# Patient Record
Sex: MALE | Race: BLACK OR AFRICAN AMERICAN | NOT HISPANIC OR LATINO | Employment: FULL TIME | URBAN - METROPOLITAN AREA
[De-identification: names, ages, dates, MRNs, and addresses within clinical notes are randomized per-mention and may not be internally consistent; named-entity substitution may affect disease eponyms.]

---

## 2021-09-27 ENCOUNTER — IMMUNIZATION (OUTPATIENT)
Dept: URGENT CARE | Facility: CLINIC | Age: 61
End: 2021-09-27
Payer: OTHER GOVERNMENT

## 2021-09-27 DIAGNOSIS — Z23 NEED FOR VACCINATION: Primary | ICD-10-CM

## 2021-09-27 PROCEDURE — 91301 COVID-19, MRNA, LNP-S, PF, 100 MCG/0.5 ML DOSE VACCINE: ICD-10-PCS | Mod: S$GLB,,, | Performed by: EMERGENCY MEDICINE

## 2021-09-27 PROCEDURE — 0012A COVID-19, MRNA, LNP-S, PF, 100 MCG/0.5 ML DOSE VACCINE: CPT | Mod: S$GLB,,, | Performed by: EMERGENCY MEDICINE

## 2021-09-27 PROCEDURE — 0012A COVID-19, MRNA, LNP-S, PF, 100 MCG/0.5 ML DOSE VACCINE: ICD-10-PCS | Mod: S$GLB,,, | Performed by: EMERGENCY MEDICINE

## 2021-09-27 PROCEDURE — 91301 COVID-19, MRNA, LNP-S, PF, 100 MCG/0.5 ML DOSE VACCINE: CPT | Mod: S$GLB,,, | Performed by: EMERGENCY MEDICINE

## 2022-11-04 ENCOUNTER — HOSPITAL ENCOUNTER (INPATIENT)
Facility: HOSPITAL | Age: 62
LOS: 3 days | Discharge: HOME | DRG: 373 | End: 2022-11-07
Attending: EMERGENCY MEDICINE | Admitting: STUDENT IN AN ORGANIZED HEALTH CARE EDUCATION/TRAINING PROGRAM
Payer: COMMERCIAL

## 2022-11-04 DIAGNOSIS — K37 APPENDICITIS, UNSPECIFIED APPENDICITIS TYPE: Primary | ICD-10-CM

## 2022-11-04 DIAGNOSIS — R10.9 ABDOMINAL PAIN: ICD-10-CM

## 2022-11-04 PROBLEM — K35.32 ACUTE APPENDICITIS WITH PERFORATION AND LOCALIZED PERITONITIS, WITHOUT ABSCESS OR GANGRENE: Status: ACTIVE | Noted: 2022-11-04

## 2022-11-04 PROBLEM — K35.30 ACUTE APPENDICITIS WITH LOCALIZED PERITONITIS, WITHOUT PERFORATION, ABSCESS, OR GANGRENE: Status: ACTIVE | Noted: 2022-11-04

## 2022-11-04 LAB
ALBUMIN SERPL BCP-MCNC: 3.3 G/DL (ref 3.5–5.2)
ALP SERPL-CCNC: 66 U/L (ref 55–135)
ALT SERPL W/O P-5'-P-CCNC: 19 U/L (ref 10–44)
ANION GAP SERPL CALC-SCNC: 11 MMOL/L (ref 8–16)
AST SERPL-CCNC: 27 U/L (ref 10–40)
BACTERIA #/AREA URNS AUTO: NORMAL /HPF
BASOPHILS # BLD AUTO: 0.05 K/UL (ref 0–0.2)
BASOPHILS NFR BLD: 0.3 % (ref 0–1.9)
BILIRUB SERPL-MCNC: 1.1 MG/DL (ref 0.1–1)
BILIRUB UR QL STRIP: NEGATIVE
BUN SERPL-MCNC: 16 MG/DL (ref 8–23)
BUN SERPL-MCNC: 18 MG/DL (ref 6–30)
CALCIUM SERPL-MCNC: 8.8 MG/DL (ref 8.7–10.5)
CHLORIDE SERPL-SCNC: 100 MMOL/L (ref 95–110)
CHLORIDE SERPL-SCNC: 101 MMOL/L (ref 95–110)
CLARITY UR REFRACT.AUTO: CLEAR
CO2 SERPL-SCNC: 22 MMOL/L (ref 23–29)
COLOR UR AUTO: YELLOW
CREAT SERPL-MCNC: 1.1 MG/DL (ref 0.5–1.4)
CREAT SERPL-MCNC: 1.1 MG/DL (ref 0.5–1.4)
DIFFERENTIAL METHOD: ABNORMAL
EOSINOPHIL # BLD AUTO: 0 K/UL (ref 0–0.5)
EOSINOPHIL NFR BLD: 0.1 % (ref 0–8)
ERYTHROCYTE [DISTWIDTH] IN BLOOD BY AUTOMATED COUNT: 13.5 % (ref 11.5–14.5)
EST. GFR  (NO RACE VARIABLE): >60 ML/MIN/1.73 M^2
GLUCOSE SERPL-MCNC: 114 MG/DL (ref 70–110)
GLUCOSE SERPL-MCNC: 125 MG/DL (ref 70–110)
GLUCOSE UR QL STRIP: NEGATIVE
HCT VFR BLD AUTO: 33.6 % (ref 40–54)
HCT VFR BLD CALC: 41 %PCV (ref 36–54)
HCV AB SERPL QL IA: NORMAL
HGB BLD-MCNC: 12.6 G/DL (ref 14–18)
HGB UR QL STRIP: ABNORMAL
HIV 1+2 AB+HIV1 P24 AG SERPL QL IA: NORMAL
HYALINE CASTS UR QL AUTO: 0 /LPF
IMM GRANULOCYTES # BLD AUTO: 0.12 K/UL (ref 0–0.04)
IMM GRANULOCYTES NFR BLD AUTO: 0.6 % (ref 0–0.5)
INFLUENZA A, MOLECULAR: NEGATIVE
INFLUENZA B, MOLECULAR: NEGATIVE
KETONES UR QL STRIP: ABNORMAL
LEUKOCYTE ESTERASE UR QL STRIP: NEGATIVE
LIPASE SERPL-CCNC: 32 U/L (ref 4–60)
LYMPHOCYTES # BLD AUTO: 1.3 K/UL (ref 1–4.8)
LYMPHOCYTES NFR BLD: 6.7 % (ref 18–48)
MCH RBC QN AUTO: 29.6 PG (ref 27–31)
MCHC RBC AUTO-ENTMCNC: 37.5 G/DL (ref 32–36)
MCV RBC AUTO: 79 FL (ref 82–98)
MICROSCOPIC COMMENT: NORMAL
MONOCYTES # BLD AUTO: 1.5 K/UL (ref 0.3–1)
MONOCYTES NFR BLD: 7.4 % (ref 4–15)
NEUTROPHILS # BLD AUTO: 16.7 K/UL (ref 1.8–7.7)
NEUTROPHILS NFR BLD: 84.9 % (ref 38–73)
NITRITE UR QL STRIP: NEGATIVE
NRBC BLD-RTO: 0 /100 WBC
PH UR STRIP: 6 [PH] (ref 5–8)
PLATELET # BLD AUTO: 189 K/UL (ref 150–450)
PMV BLD AUTO: 11.3 FL (ref 9.2–12.9)
POC IONIZED CALCIUM: 1.05 MMOL/L (ref 1.06–1.42)
POC TCO2 (MEASURED): 25 MMOL/L (ref 23–29)
POTASSIUM BLD-SCNC: 3.5 MMOL/L (ref 3.5–5.1)
POTASSIUM SERPL-SCNC: 3.7 MMOL/L (ref 3.5–5.1)
PROT SERPL-MCNC: 7.8 G/DL (ref 6–8.4)
PROT UR QL STRIP: ABNORMAL
RBC # BLD AUTO: 4.25 M/UL (ref 4.6–6.2)
RBC #/AREA URNS AUTO: 2 /HPF (ref 0–4)
SAMPLE: ABNORMAL
SODIUM BLD-SCNC: 135 MMOL/L (ref 136–145)
SODIUM SERPL-SCNC: 134 MMOL/L (ref 136–145)
SP GR UR STRIP: 1.02 (ref 1–1.03)
SPECIMEN SOURCE: NORMAL
URN SPEC COLLECT METH UR: ABNORMAL
WBC # BLD AUTO: 19.69 K/UL (ref 3.9–12.7)
WBC #/AREA URNS AUTO: 1 /HPF (ref 0–5)

## 2022-11-04 PROCEDURE — 80053 COMPREHEN METABOLIC PANEL: CPT | Performed by: EMERGENCY MEDICINE

## 2022-11-04 PROCEDURE — 99285 EMERGENCY DEPT VISIT HI MDM: CPT | Mod: ,,, | Performed by: EMERGENCY MEDICINE

## 2022-11-04 PROCEDURE — 93010 EKG 12-LEAD: ICD-10-PCS | Mod: ,,, | Performed by: INTERNAL MEDICINE

## 2022-11-04 PROCEDURE — 96365 THER/PROPH/DIAG IV INF INIT: CPT

## 2022-11-04 PROCEDURE — 63600175 PHARM REV CODE 636 W HCPCS

## 2022-11-04 PROCEDURE — 25000003 PHARM REV CODE 250: Performed by: STUDENT IN AN ORGANIZED HEALTH CARE EDUCATION/TRAINING PROGRAM

## 2022-11-04 PROCEDURE — 87502 INFLUENZA DNA AMP PROBE: CPT

## 2022-11-04 PROCEDURE — 25000003 PHARM REV CODE 250

## 2022-11-04 PROCEDURE — 94761 N-INVAS EAR/PLS OXIMETRY MLT: CPT

## 2022-11-04 PROCEDURE — 96375 TX/PRO/DX INJ NEW DRUG ADDON: CPT

## 2022-11-04 PROCEDURE — S0030 INJECTION, METRONIDAZOLE: HCPCS

## 2022-11-04 PROCEDURE — 81001 URINALYSIS AUTO W/SCOPE: CPT | Performed by: EMERGENCY MEDICINE

## 2022-11-04 PROCEDURE — 63600175 PHARM REV CODE 636 W HCPCS: Performed by: STUDENT IN AN ORGANIZED HEALTH CARE EDUCATION/TRAINING PROGRAM

## 2022-11-04 PROCEDURE — 99285 EMERGENCY DEPT VISIT HI MDM: CPT | Mod: 25

## 2022-11-04 PROCEDURE — 20600001 HC STEP DOWN PRIVATE ROOM

## 2022-11-04 PROCEDURE — 83690 ASSAY OF LIPASE: CPT | Performed by: EMERGENCY MEDICINE

## 2022-11-04 PROCEDURE — 25500020 PHARM REV CODE 255: Performed by: EMERGENCY MEDICINE

## 2022-11-04 PROCEDURE — 93005 ELECTROCARDIOGRAM TRACING: CPT

## 2022-11-04 PROCEDURE — 86803 HEPATITIS C AB TEST: CPT | Performed by: PHYSICIAN ASSISTANT

## 2022-11-04 PROCEDURE — 99285 PR EMERGENCY DEPT VISIT,LEVEL V: ICD-10-PCS | Mod: ,,, | Performed by: EMERGENCY MEDICINE

## 2022-11-04 PROCEDURE — 87389 HIV-1 AG W/HIV-1&-2 AB AG IA: CPT | Performed by: PHYSICIAN ASSISTANT

## 2022-11-04 PROCEDURE — 93010 ELECTROCARDIOGRAM REPORT: CPT | Mod: ,,, | Performed by: INTERNAL MEDICINE

## 2022-11-04 PROCEDURE — 85025 COMPLETE CBC W/AUTO DIFF WBC: CPT | Performed by: EMERGENCY MEDICINE

## 2022-11-04 RX ORDER — ENOXAPARIN SODIUM 100 MG/ML
40 INJECTION SUBCUTANEOUS EVERY 24 HOURS
Status: DISCONTINUED | OUTPATIENT
Start: 2022-11-04 | End: 2022-11-07 | Stop reason: HOSPADM

## 2022-11-04 RX ORDER — ONDANSETRON 2 MG/ML
4 INJECTION INTRAMUSCULAR; INTRAVENOUS EVERY 6 HOURS PRN
Status: DISCONTINUED | OUTPATIENT
Start: 2022-11-04 | End: 2022-11-07 | Stop reason: HOSPADM

## 2022-11-04 RX ORDER — MORPHINE SULFATE 4 MG/ML
4 INJECTION, SOLUTION INTRAMUSCULAR; INTRAVENOUS
Status: COMPLETED | OUTPATIENT
Start: 2022-11-04 | End: 2022-11-04

## 2022-11-04 RX ORDER — OXYCODONE HYDROCHLORIDE 5 MG/1
5 TABLET ORAL EVERY 6 HOURS PRN
Status: DISCONTINUED | OUTPATIENT
Start: 2022-11-04 | End: 2022-11-07 | Stop reason: HOSPADM

## 2022-11-04 RX ORDER — TALC
6 POWDER (GRAM) TOPICAL NIGHTLY PRN
Status: DISCONTINUED | OUTPATIENT
Start: 2022-11-04 | End: 2022-11-07 | Stop reason: HOSPADM

## 2022-11-04 RX ORDER — ACETAMINOPHEN 500 MG
1000 TABLET ORAL
Status: COMPLETED | OUTPATIENT
Start: 2022-11-04 | End: 2022-11-04

## 2022-11-04 RX ORDER — ACETAMINOPHEN 325 MG/1
650 TABLET ORAL EVERY 6 HOURS PRN
Status: DISCONTINUED | OUTPATIENT
Start: 2022-11-04 | End: 2022-11-07 | Stop reason: HOSPADM

## 2022-11-04 RX ORDER — METRONIDAZOLE 500 MG/100ML
500 INJECTION, SOLUTION INTRAVENOUS
Status: COMPLETED | OUTPATIENT
Start: 2022-11-04 | End: 2022-11-04

## 2022-11-04 RX ORDER — DICYCLOMINE HYDROCHLORIDE 10 MG/1
20 CAPSULE ORAL
Status: COMPLETED | OUTPATIENT
Start: 2022-11-04 | End: 2022-11-04

## 2022-11-04 RX ORDER — SODIUM CHLORIDE 9 MG/ML
INJECTION, SOLUTION INTRAVENOUS CONTINUOUS
Status: DISCONTINUED | OUTPATIENT
Start: 2022-11-04 | End: 2022-11-05

## 2022-11-04 RX ORDER — SODIUM CHLORIDE 0.9 % (FLUSH) 0.9 %
10 SYRINGE (ML) INJECTION
Status: DISCONTINUED | OUTPATIENT
Start: 2022-11-04 | End: 2022-11-07 | Stop reason: HOSPADM

## 2022-11-04 RX ADMIN — ENOXAPARIN SODIUM 40 MG: 100 INJECTION SUBCUTANEOUS at 05:11

## 2022-11-04 RX ADMIN — DICYCLOMINE HYDROCHLORIDE 20 MG: 10 CAPSULE ORAL at 12:11

## 2022-11-04 RX ADMIN — PIPERACILLIN SODIUM AND TAZOBACTAM SODIUM 4.5 G: 4; .5 INJECTION, POWDER, LYOPHILIZED, FOR SOLUTION INTRAVENOUS at 04:11

## 2022-11-04 RX ADMIN — OXYCODONE 5 MG: 5 TABLET ORAL at 05:11

## 2022-11-04 RX ADMIN — METRONIDAZOLE 500 MG: 500 INJECTION, SOLUTION INTRAVENOUS at 06:11

## 2022-11-04 RX ADMIN — PIPERACILLIN SODIUM AND TAZOBACTAM SODIUM 4.5 G: 4; .5 INJECTION, POWDER, LYOPHILIZED, FOR SOLUTION INTRAVENOUS at 11:11

## 2022-11-04 RX ADMIN — SODIUM CHLORIDE: 0.9 INJECTION, SOLUTION INTRAVENOUS at 05:11

## 2022-11-04 RX ADMIN — CEFTRIAXONE 1 G: 1 INJECTION, SOLUTION INTRAVENOUS at 05:11

## 2022-11-04 RX ADMIN — ACETAMINOPHEN 650 MG: 325 TABLET ORAL at 07:11

## 2022-11-04 RX ADMIN — PIPERACILLIN SODIUM AND TAZOBACTAM SODIUM 4.5 G: 4; .5 INJECTION, POWDER, LYOPHILIZED, FOR SOLUTION INTRAVENOUS at 07:11

## 2022-11-04 RX ADMIN — SODIUM CHLORIDE: 0.9 INJECTION, SOLUTION INTRAVENOUS at 07:11

## 2022-11-04 RX ADMIN — ACETAMINOPHEN 1000 MG: 500 TABLET ORAL at 12:11

## 2022-11-04 RX ADMIN — OXYCODONE 5 MG: 5 TABLET ORAL at 12:11

## 2022-11-04 RX ADMIN — IOHEXOL 100 ML: 350 INJECTION, SOLUTION INTRAVENOUS at 03:11

## 2022-11-04 RX ADMIN — OXYCODONE 5 MG: 5 TABLET ORAL at 11:11

## 2022-11-04 RX ADMIN — MORPHINE SULFATE 4 MG: 4 INJECTION INTRAVENOUS at 05:11

## 2022-11-04 NOTE — ED NOTES
Pt ambulatory to the restroom independently; steady gait noted. Changed into hospital gown at this time.

## 2022-11-04 NOTE — PLAN OF CARE
New admit from ED, POC reviewed with patient, verbalized understanding, AAOX4, complains of pain 7/10, PRN medication given, IVF's infusing without difficulty, denies SOB or CP, VSS, will continue to monitor.    Problem: Adult Inpatient Plan of Care  Goal: Plan of Care Review  Outcome: Ongoing, Progressing  Goal: Patient-Specific Goal (Individualized)  Outcome: Ongoing, Progressing  Goal: Absence of Hospital-Acquired Illness or Injury  Outcome: Ongoing, Progressing  Goal: Optimal Comfort and Wellbeing  Outcome: Ongoing, Progressing  Goal: Readiness for Transition of Care  Outcome: Ongoing, Progressing

## 2022-11-04 NOTE — HPI
Duglas Us is a 62 y.o. male with no significant past medical history who presented to the ED with bilateral lower quadrant abdominal pain that started Saturday night. It has been intermittent but the episodes have been more severe which prompted him to come to the ED today. Reports associated anorexia and chills but no fevers, nausea, vomiting, or diarrhea. Upon arrival he is AF and HDS. Labs show elevated WBC at 19.7 and anemia with Hgb of 12.6. CT showed phelgmon in the RLQ without definitive visualization of the appendix. He is not currently having any pain.

## 2022-11-04 NOTE — ED NOTES
I-STAT Chem-8+ Results:   Value Reference Range   Sodium 135 136-145 mmol/L   Potassium  3.5 3.5-5.1 mmol/L   Chloride 100  mmol/L   Ionized Calcium 1.05 1.06-1.42 mmol/L   CO2 (measured) 25 23-29 mmol/L   Glucose 125  mg/dL   BUN 18 6-30 mg/dL   Creatinine 1.1 0.5-1.4 mg/dL   Hematocrit 41 36-54%

## 2022-11-04 NOTE — ASSESSMENT & PLAN NOTE
Duglas Us is a 62 y.o. male with likely suppurative appendicitis.     - Patient seen and examined. Labs and imaging reviewed. Case discussed with Dr. Sneed  - Has been having pain for 6 days and labs and imaging consistent with phlegmon from likely appendicitis.   - Admit to general surgery  - NPO  - mIVF  - Zosyn  - MM pain meds   - PRN nausea meds  - Daily labs  - SCDs, lovenox

## 2022-11-04 NOTE — ED PROVIDER NOTES
Encounter Date: 11/4/2022       History     Chief Complaint   Patient presents with    Abdominal Pain     Pt arrives c/o lower quadrant abdominal pain that began Saturday night. Denies N/V or diarrhea.     62 year old male who denies any past medical history is presenting to the emergency department for periumbilical abdominal pain for the past week.  Patient states that he has been having intermittent abdominal pain and decreased p.o. intake, no nausea, vomiting or diarrhea. He does note chills and subjective fever yesterday. No fevers today. No urinary symptoms, hematuria, frequency.  Denies similar symptoms in the past.  He has been taking Gas-X at home without relief.  Patient denies alcohol or drug use.  He had a normal bowel movement earlier today.  No chest pain, shortness of breath, cough, congestion.  Patient denies history of abdominal surgeries in the past.    The history is provided by the patient and medical records. No  was used.   Review of patient's allergies indicates:  No Known Allergies  No past medical history on file.  No past surgical history on file.  No family history on file.     Review of Systems   Constitutional:  Positive for fever. Negative for chills.   HENT:  Negative for rhinorrhea and sore throat.    Respiratory:  Negative for cough and shortness of breath.    Cardiovascular:  Negative for chest pain and leg swelling.   Gastrointestinal:  Positive for abdominal pain. Negative for diarrhea, nausea and vomiting.   Genitourinary:  Negative for dysuria and hematuria.   Musculoskeletal:  Negative for back pain and neck pain.   Skin:  Negative for rash and wound.   Neurological:  Negative for seizures and headaches.   Psychiatric/Behavioral:  Negative for agitation and behavioral problems.      Physical Exam     Initial Vitals [11/04/22 0016]   BP Pulse Resp Temp SpO2   (!) 158/80 93 20 98.6 °F (37 °C) 99 %      MAP       --         Physical Exam    Nursing note and  vitals reviewed.  Constitutional: He appears well-developed and well-nourished. He is not diaphoretic. No distress.   HENT:   Head: Normocephalic and atraumatic.   Eyes: Conjunctivae and EOM are normal.   Neck: Neck supple.   Normal range of motion.  Cardiovascular:  Normal rate, regular rhythm, normal heart sounds and intact distal pulses.           Pulmonary/Chest: Breath sounds normal. No respiratory distress. He has no wheezes. He has no rhonchi. He has no rales.   Abdominal: Abdomen is soft. Bowel sounds are normal. He exhibits no distension. There is abdominal tenderness (Periumbilical tenderness). There is no rebound and no guarding.   Musculoskeletal:         General: No tenderness or edema. Normal range of motion.      Cervical back: Normal range of motion and neck supple.     Neurological: He is alert. He has normal strength.   Skin: Skin is warm and dry.   Psychiatric: He has a normal mood and affect. Thought content normal.       ED Course   Procedures  Labs Reviewed   COMPREHENSIVE METABOLIC PANEL - Abnormal; Notable for the following components:       Result Value    Sodium 134 (*)     CO2 22 (*)     Glucose 114 (*)     Albumin 3.3 (*)     Total Bilirubin 1.1 (*)     All other components within normal limits    Narrative:     Release to patient->Immediate   URINALYSIS, REFLEX TO URINE CULTURE - Abnormal; Notable for the following components:    Protein, UA 1+ (*)     Ketones, UA 1+ (*)     Occult Blood UA Trace (*)     All other components within normal limits    Narrative:     Specimen Source->Urine   CBC W/ AUTO DIFFERENTIAL - Abnormal; Notable for the following components:    WBC 19.69 (*)     RBC 4.25 (*)     Hemoglobin 12.6 (*)     Hematocrit 33.6 (*)     MCV 79 (*)     MCHC 37.5 (*)     Immature Granulocytes 0.6 (*)     Gran # (ANC) 16.7 (*)     Immature Grans (Abs) 0.12 (*)     Mono # 1.5 (*)     Gran % 84.9 (*)     Lymph % 6.7 (*)     All other components within normal limits   ISTAT PROCEDURE  - Abnormal; Notable for the following components:    POC Glucose 125 (*)     POC Sodium 135 (*)     POC Ionized Calcium 1.05 (*)     All other components within normal limits   INFLUENZA A & B BY MOLECULAR   HIV 1 / 2 ANTIBODY    Narrative:     Release to patient->Immediate   HEPATITIS C ANTIBODY    Narrative:     Release to patient->Immediate   LIPASE    Narrative:     Release to patient->Immediate   URINALYSIS MICROSCOPIC    Narrative:     Specimen Source->Urine   ISTAT CHEM8     EKG Readings: (Independently Interpreted)   Initial Reading: No STEMI. Rhythm: Normal Sinus Rhythm. Heart Rate: 83. ST Segments: Normal ST Segments. T Waves: Normal. Clinical Impression: Normal Sinus Rhythm   ECG Results              EKG 12-lead (Final result)  Result time 11/04/22 08:38:43      Final result by Interface, Lab In [facility] (11/04/22 08:38:43)                   Narrative:    Test Reason : R10.9,    Vent. Rate : 083 BPM     Atrial Rate : 083 BPM     P-R Int : 144 ms          QRS Dur : 068 ms      QT Int : 352 ms       P-R-T Axes : 056 065 060 degrees     QTc Int : 413 ms    Normal sinus rhythm  Normal ECG  No previous ECGs available  Confirmed by Brown KAY MD ([id number]) on 11/4/2022 8:38:33 AM    Referred By: [email or web address]   SELF           Confirmed By:Brown KAY MD                                  Imaging Results               CT Abdomen Pelvis With Contrast (Final result)  Result time 11/04/22 05:03:42      Final result by Jaron Farooq MD (11/04/22 05:03:42)                   Impression:      Acute inflammation within the terminal ileum and cecum with surrounding phlegmon formation, particularly about the cecal base.  This could represent acute appendicitis with reactive inflammatory changes/phlegmon.  Reactive enlarged lymph nodes in the mesentery.  No free air to indicate perforation.  Recommend consultation with surgery.    Asymmetric wall thickening of the gastric body.  Recommend consultation with GI for direct  visualization.    Prostatomegaly.  Suggest correlation with PSA.    This report was flagged in Epic as abnormal.    The critical information above was relayed directly by Jaron Farooq MD by [facility] secure chat to Sabra Jensen on 11/4/2022 at 05:02.    Electronically signed by resident: Vasquez Alexis  Date:    11/04/2022  Time:    04:23    Electronically signed by: Jaron Farooq MD  Date:    11/04/2022  Time:    05:03               Narrative:    EXAMINATION:  CT ABDOMEN PELVIS WITH CONTRAST    CLINICAL HISTORY:  Abdominal pain, acute, nonlocalized;    TECHNIQUE:  The patient was surveyed from the lung bases through the pelvis after the administration of 100 cc Omni 350 IV contrast. No oral contrast was administered. The data was reconstructed for coronal, sagittal, and axial images.    COMPARISON:  None.    FINDINGS:  CHEST:    Lungs/Pleura: No focal consolidation or pleural effusion in the visualized lungs    Heart: Not enlarged.  No pericardial effusion.    Thoracic soft tissues: No significant abnormality.    ABDOMEN:    Liver: Normal size and attenuation.  Simple cyst within the inferior aspect right hepatic lobe.  Additional subcentimeter hypodensities, too small to characterize.  Geographic area of hypoattenuation adjacent to the falciform ligament, consistent with focal fatty infiltration.    Gallbladder/Bile ducts: Gallbladder is partially distended.  No pericholecystic inflammatory changes.  No intrahepatic or extrahepatic biliary ductal dilatation.    Spleen:Unremarkable.    Stomach: There is asymmetric wall thickening of the greater curvature of the stomach.    Pancreas: No mass or peripancreatic fat stranding.    Adrenals: Unremarkable.    Renal/Ureters: The kidneys are normal in size and location. No hydronephrosis.  The ureters are normal in course and caliber. The urinary bladder is unremarkable.    Reproductive: Prostate is enlarged measuring 5.6 cm.    Bowel: There is wall thickening of the terminal  ileum and cecum with significant amounts of fat stranding and adjacent free fluid.  Inflammatory phlegmon changes in the right lower quadrant vertically but the cecal base.  The appendix is not confidently visualized and is suspected to be within this area of inflammation.  Reactive enlarged lymph nodes in the mesentery.  The remaining proximal small bowel and distal large bowel are normal caliber without evidence of obstruction or inflammation.    Peritoneum: Trace free fluid in the right lower quadrant and pelvis..  No intraperitoneal free air.    Lymph Nodes: No pathologic payton enlargement in the abdomen or pelvis.    Vasculature: Abdominal aorta is normal in course and caliber.  Mild calcifications of the bilateral common iliac arteries.    Bones: Degenerative changes of the spine facet arthropathy lumbar spine and osteophyte production of the lower thoracic spine..  No acute fractures or osseous destructive lesions.    Soft Tissues: No significant abnormality.                                       Medications   sodium chloride 0.9% flush 10 mL (has no administration in time range)   ondansetron injection 4 mg (has no administration in time range)   melatonin tablet 6 mg (has no administration in time range)   oxyCODONE immediate release tablet 5 mg (has no administration in time range)   enoxaparin injection 40 mg (has no administration in time range)   0.9%  NaCl infusion ( Intravenous New Bag 11/4/22 0750)   piperacillin-tazobactam 4.5 g in sodium chloride 0.9% 100 mL IVPB (ready to mix system) (4.5 g Intravenous New Bag 11/4/22 0718)   acetaminophen tablet 1,000 mg (1,000 mg Oral Given 11/4/22 0059)   dicyclomine capsule 20 mg (20 mg Oral Given 11/4/22 0059)   iohexoL (OMNIPAQUE 350) injection 100 mL (100 mLs Intravenous Given 11/4/22 0325)   metronidazole IVPB 500 mg (0 mg Intravenous Stopped 11/4/22 0700)   cefTRIAXone (ROCEPHIN) 1 g/50 mL D5W IVPB (0 g Intravenous Stopped 11/4/22 0552)   morphine  injection 4 mg (4 mg Intravenous Given 11/4/22 0520)     Medical Decision Making:   History:   Old Medical Records: I decided to obtain old medical records.  Initial Assessment:   62 year old male who denies any past medical history is presenting to the emergency department for periumbilical abdominal pain for the past week.   Differential Diagnosis:   Including, but not limited to:  GERD, intestinal spasm, gastroenteritis, gastritis, ulcer, cholecystitis, cholelithiasis, gallstones, pancreatitis, ileus, small bowel obstruction, appendicitis, diverticulitis, colitis, constipation, intestinal gas pain.   Independently Interpreted Test(s):   I have ordered and independently interpreted EKG Reading(s) - see prior notes  Clinical Tests:   Lab Tests: Ordered and Reviewed       <> Summary of Lab: Leukocytosis.  LFTs normal.  Kidney function normal.  Urinalysis without signs of infection.  Radiological Study: Ordered and Reviewed  Medical Tests: Reviewed and Ordered  ED Management:  CT concerning for acute appendicitis.  Discussed with general surgery.  Started ceftriaxone and metronidazole.  Patient will be admitted to their service for continued monitoring and treatment.  He remained stable while in the emergency department.  Other:   I have discussed this case with another health care provider.          Attending Attestation:   Physician Attestation Statement for Resident:  As the supervising MD   Physician Attestation Statement: I have personally seen and examined this patient.   I agree with the above history.  -: 63 yo male presenting with lower abdominal pain, which she describes as cramping.  Denies prior abdominal surgeries.  Denies testicular pain, dysuria, hematuria.     As the supervising MD I agree with the above PE.   -: No distress  Abdomen soft, normal bowel sounds, right greater than left lower quadrant tenderness, negative Baez's, not peritonitic  Lungs clear   As the supervising MD I agree with the  above treatment, course, plan, and disposition.   -: Labs notable for leukocytosis.  CT concerning for likely acute appendicitis.  Case discussed with General surgery, initiated IV antibiotics.  Admitted to general surgery service for further management.  Patient understands and agrees the plan for admission.     I have reviewed and agree with the residents interpretation of the following: lab data, CT scans and EKG.  I have reviewed the following: old records at this facility.              ED Course as of 11/04/22 0921 Fri Nov 04, 2022   0123 POC Creatinine: 1.1 [KL]   0207 Leukocytes, UA: Negative [AB]   0207 NITRITE UA: Negative  No UTI  [AB]   0229 WBC(!): 19.69  Leukocytosis  [AB]   0252 WBC(!): 19.69 [KL]   0513 Discussed with general surgery.  They will come evaluate the patient.  Patient updated on results and plan for general surgery to evaluate.  IV antibiotics ordered. [KL]      ED Course User Index  [AB] Ruiz Downs MD  [KL] Sabra Jensen MD                 Clinical Impression:   Final diagnoses:  [R10.9] Abdominal pain  [K37] Appendicitis, unspecified appendicitis type (Primary)      ED Disposition Condition    Admit Stable                Sabra Jensen MD  Resident  11/04/22 0607       Ruiz Downs MD  11/04/22 0921

## 2022-11-04 NOTE — SUBJECTIVE & OBJECTIVE
No current facility-administered medications on file prior to encounter.     No current outpatient medications on file prior to encounter.       Review of patient's allergies indicates:  No Known Allergies    No past medical history on file.  No past surgical history on file.  Family History    None       Tobacco Use    Smoking status: Not on file    Smokeless tobacco: Not on file   Substance and Sexual Activity    Alcohol use: Not on file    Drug use: Not on file    Sexual activity: Not on file     Review of Systems   Constitutional:  Positive for appetite change and chills. Negative for diaphoresis, fever and unexpected weight change.   HENT:  Negative for sinus pressure and sore throat.    Eyes:  Negative for photophobia and visual disturbance.   Respiratory:  Negative for cough and shortness of breath.    Cardiovascular:  Negative for chest pain, palpitations and leg swelling.   Gastrointestinal:  Positive for abdominal pain. Negative for abdominal distention, blood in stool, diarrhea, nausea and vomiting.   Musculoskeletal:  Negative for arthralgias and myalgias.   Skin:  Negative for rash and wound.   Neurological:  Negative for syncope and headaches.   Psychiatric/Behavioral:  Negative for confusion and hallucinations.    Objective:     Vital Signs (Most Recent):  Temp: 99.4 °F (37.4 °C) (11/04/22 0834)  Pulse: 84 (11/04/22 0834)  Resp: 16 (11/04/22 0834)  BP: (!) 161/93 (11/04/22 0834)  SpO2: 99 % (11/04/22 0834)   Vital Signs (24h Range):  Temp:  [98.6 °F (37 °C)-99.4 °F (37.4 °C)] 99.4 °F (37.4 °C)  Pulse:  [84-93] 84  Resp:  [16-20] 16  SpO2:  [98 %-99 %] 99 %  BP: (132-161)/(72-93) 161/93        There is no height or weight on file to calculate BMI.    Physical Exam  Vitals and nursing note reviewed.   Constitutional:       General: He is not in acute distress.     Appearance: He is well-developed. He is not diaphoretic.   HENT:      Mouth/Throat:      Pharynx: Oropharynx is clear. No oropharyngeal  exudate.   Eyes:      General: No scleral icterus.     Extraocular Movements: Extraocular movements intact.   Cardiovascular:      Rate and Rhythm: Normal rate and regular rhythm.   Pulmonary:      Effort: Pulmonary effort is normal. No respiratory distress.   Abdominal:      General: There is no distension.      Palpations: Abdomen is soft.      Tenderness: There is no abdominal tenderness.   Musculoskeletal:         General: No deformity. Normal range of motion.   Skin:     General: Skin is warm and dry.      Coloration: Skin is not jaundiced.   Neurological:      General: No focal deficit present.      Mental Status: He is alert.      Cranial Nerves: No cranial nerve deficit.   Psychiatric:         Mood and Affect: Mood normal.         Behavior: Behavior normal.       Significant Labs:  I have reviewed all pertinent lab results within the past 24 hours.  CBC:   Recent Labs   Lab 11/04/22  0155   WBC 19.69*   RBC 4.25*   HGB 12.6*   HCT 33.6*      MCV 79*   MCH 29.6   MCHC 37.5*     CMP:   Recent Labs   Lab 11/04/22  0110   *   CALCIUM 8.8   ALBUMIN 3.3*   PROT 7.8   *   K 3.7   CO2 22*      BUN 16   CREATININE 1.1   ALKPHOS 66   ALT 19   AST 27   BILITOT 1.1*       Significant Diagnostics:  I have reviewed all pertinent imaging results/findings within the past 24 hours.

## 2022-11-04 NOTE — ED TRIAGE NOTES
Duglas Us, a 62 y.o. male presents to the ED w/ complaint of lower abdominal pain since Saturday evening. Says he ate some spicy food Saturday night and thinks it upset his stomach. Has had chills but no way to take temperature. Denies n/v/d.      Triage note:  Chief Complaint   Patient presents with    Abdominal Pain     Pt arrives c/o lower quadrant abdominal pain that began Saturday night. Denies N/V or diarrhea.     Review of patient's allergies indicates:  No Known Allergies  No past medical history on file.

## 2022-11-04 NOTE — CONSULTS
[facility] - [facility] Dept  General Surgery  Consult Note    Patient Name: Duglas Us  MRN: [id number]  Code Status: Full Code  Admission Date: 11/4/2022  Hospital Length of Stay: 0 days  Attending Physician: Sami Sneed MD  Primary Care Provider: Primary Doctor No    Patient information was obtained from patient and ER records.     Inpatient consult to General Surgery  Consult performed by: Candie Avelar MD  Consult ordered by: Sabra Jensen MD        Subjective:     Principal Problem: Acute appendicitis with localized peritonitis, without perforation, abscess, or gangrene    History of Present Illness: Duglas Us is a 62 y.o. male with no significant past medical history who presented to the ED with bilateral lower quadrant abdominal pain that started Saturday night. It has been intermittent but the episodes have been more severe which prompted him to come to the ED today. Reports associated anorexia and chills but no fevers, nausea, vomiting, or diarrhea. Upon arrival he is AF and HDS. Labs show elevated WBC at 19.7 and anemia with Hgb of 12.6. CT showed phelgmon in the RLQ without definitive visualization of the appendix. He is not currently having any pain.      No current facility-administered medications on file prior to encounter.     No current outpatient medications on file prior to encounter.       Review of patient's allergies indicates:  No Known Allergies    No past medical history on file.  No past surgical history on file.  Family History    None       Tobacco Use    Smoking status: Not on file    Smokeless tobacco: Not on file   Substance and Sexual Activity    Alcohol use: Not on file    Drug use: Not on file    Sexual activity: Not on file     Review of Systems   Constitutional:  Positive for appetite change and chills. Negative for diaphoresis, fever and unexpected weight change.   HENT:  Negative for sinus pressure and sore throat.    Eyes:  Negative for photophobia and  visual disturbance.   Respiratory:  Negative for cough and shortness of breath.    Cardiovascular:  Negative for chest pain, palpitations and leg swelling.   Gastrointestinal:  Positive for abdominal pain. Negative for abdominal distention, blood in stool, diarrhea, nausea and vomiting.   Musculoskeletal:  Negative for arthralgias and myalgias.   Skin:  Negative for rash and wound.   Neurological:  Negative for syncope and headaches.   Psychiatric/Behavioral:  Negative for confusion and hallucinations.    Objective:     Vital Signs (Most Recent):  Temp: 99.4 °F (37.4 °C) (11/04/22 0834)  Pulse: 84 (11/04/22 0834)  Resp: 16 (11/04/22 0834)  BP: (!) 161/93 (11/04/22 0834)  SpO2: 99 % (11/04/22 0834)   Vital Signs (24h Range):  Temp:  [98.6 °F (37 °C)-99.4 °F (37.4 °C)] 99.4 °F (37.4 °C)  Pulse:  [84-93] 84  Resp:  [16-20] 16  SpO2:  [98 %-99 %] 99 %  BP: (132-161)/(72-93) 161/93        There is no height or weight on file to calculate BMI.    Physical Exam  Vitals and nursing note reviewed.   Constitutional:       General: He is not in acute distress.     Appearance: He is well-developed. He is not diaphoretic.   HENT:      Mouth/Throat:      Pharynx: Oropharynx is clear. No oropharyngeal exudate.   Eyes:      General: No scleral icterus.     Extraocular Movements: Extraocular movements intact.   Cardiovascular:      Rate and Rhythm: Normal rate and regular rhythm.   Pulmonary:      Effort: Pulmonary effort is normal. No respiratory distress.   Abdominal:      General: There is no distension.      Palpations: Abdomen is soft.      Tenderness: There is no abdominal tenderness.   Musculoskeletal:         General: No deformity. Normal range of motion.   Skin:     General: Skin is warm and dry.      Coloration: Skin is not jaundiced.   Neurological:      General: No focal deficit present.      Mental Status: He is alert.      Cranial Nerves: No cranial nerve deficit.   Psychiatric:         Mood and Affect: Mood normal.          Behavior: Behavior normal.       Significant Labs:  I have reviewed all pertinent lab results within the past 24 hours.  CBC:   Recent Labs   Lab 11/04/22  0155   WBC 19.69*   RBC 4.25*   HGB 12.6*   HCT 33.6*      MCV 79*   MCH 29.6   MCHC 37.5*     CMP:   Recent Labs   Lab 11/04/22  0110   *   CALCIUM 8.8   ALBUMIN 3.3*   PROT 7.8   *   K 3.7   CO2 22*      BUN 16   CREATININE 1.1   ALKPHOS 66   ALT 19   AST 27   BILITOT 1.1*       Significant Diagnostics:  I have reviewed all pertinent imaging results/findings within the past 24 hours.      Assessment/Plan:     * Acute appendicitis with localized peritonitis, without perforation, abscess, or gangrene  Duglas Us is a 62 y.o. male with likely suppurative appendicitis.     - Patient seen and examined. Labs and imaging reviewed. Case discussed with Dr. Sneed  - Has been having pain for 6 days and labs and imaging consistent with phlegmon from likely appendicitis.   - Admit to general surgery  - NPO  - mIVF  - Zosyn  - MM pain meds   - PRN nausea meds  - Daily labs  - SCDs, lovenox      VTE Risk Mitigation (From admission, onward)         Ordered     enoxaparin injection 40 mg  Daily         11/04/22 0602     IP VTE HIGH RISK PATIENT  Once         11/04/22 0602                Thank you for your consult. I will follow-up with patient. Please contact us if you have any additional questions.    Candie Avelar MD  General Surgery  [facility]

## 2022-11-04 NOTE — H&P
Please see consult note dated 11/04/2022 for detailed H&P.    Candie Avelar MD  General Surgery, PGY-4  [phone]

## 2022-11-05 LAB
ALBUMIN SERPL BCP-MCNC: 2.7 G/DL (ref 3.5–5.2)
ALP SERPL-CCNC: 65 U/L (ref 55–135)
ALT SERPL W/O P-5'-P-CCNC: 19 U/L (ref 10–44)
ANION GAP SERPL CALC-SCNC: 11 MMOL/L (ref 8–16)
AST SERPL-CCNC: 24 U/L (ref 10–40)
BASOPHILS # BLD AUTO: 0.03 K/UL (ref 0–0.2)
BASOPHILS NFR BLD: 0.2 % (ref 0–1.9)
BILIRUB SERPL-MCNC: 1.7 MG/DL (ref 0.1–1)
BUN SERPL-MCNC: 12 MG/DL (ref 8–23)
CALCIUM SERPL-MCNC: 8.2 MG/DL (ref 8.7–10.5)
CHLORIDE SERPL-SCNC: 103 MMOL/L (ref 95–110)
CO2 SERPL-SCNC: 24 MMOL/L (ref 23–29)
CREAT SERPL-MCNC: 1.1 MG/DL (ref 0.5–1.4)
DIFFERENTIAL METHOD: ABNORMAL
EOSINOPHIL # BLD AUTO: 0 K/UL (ref 0–0.5)
EOSINOPHIL NFR BLD: 0.1 % (ref 0–8)
ERYTHROCYTE [DISTWIDTH] IN BLOOD BY AUTOMATED COUNT: 13.8 % (ref 11.5–14.5)
EST. GFR  (NO RACE VARIABLE): >60 ML/MIN/1.73 M^2
GLUCOSE SERPL-MCNC: 87 MG/DL (ref 70–110)
HCT VFR BLD AUTO: 34.8 % (ref 40–54)
HGB BLD-MCNC: 12.5 G/DL (ref 14–18)
IMM GRANULOCYTES # BLD AUTO: 0.23 K/UL (ref 0–0.04)
IMM GRANULOCYTES NFR BLD AUTO: 1.3 % (ref 0–0.5)
LYMPHOCYTES # BLD AUTO: 1.2 K/UL (ref 1–4.8)
LYMPHOCYTES NFR BLD: 6.6 % (ref 18–48)
MAGNESIUM SERPL-MCNC: 1.7 MG/DL (ref 1.6–2.6)
MCH RBC QN AUTO: 29.3 PG (ref 27–31)
MCHC RBC AUTO-ENTMCNC: 35.9 G/DL (ref 32–36)
MCV RBC AUTO: 82 FL (ref 82–98)
MONOCYTES # BLD AUTO: 1.6 K/UL (ref 0.3–1)
MONOCYTES NFR BLD: 8.8 % (ref 4–15)
NEUTROPHILS # BLD AUTO: 14.8 K/UL (ref 1.8–7.7)
NEUTROPHILS NFR BLD: 83 % (ref 38–73)
NRBC BLD-RTO: 0 /100 WBC
PHOSPHATE SERPL-MCNC: 3.2 MG/DL (ref 2.7–4.5)
PLATELET # BLD AUTO: 183 K/UL (ref 150–450)
PMV BLD AUTO: 12.1 FL (ref 9.2–12.9)
POTASSIUM SERPL-SCNC: 3.4 MMOL/L (ref 3.5–5.1)
PROT SERPL-MCNC: 6.7 G/DL (ref 6–8.4)
RBC # BLD AUTO: 4.26 M/UL (ref 4.6–6.2)
SODIUM SERPL-SCNC: 138 MMOL/L (ref 136–145)
WBC # BLD AUTO: 17.81 K/UL (ref 3.9–12.7)

## 2022-11-05 PROCEDURE — 63600175 PHARM REV CODE 636 W HCPCS: Performed by: STUDENT IN AN ORGANIZED HEALTH CARE EDUCATION/TRAINING PROGRAM

## 2022-11-05 PROCEDURE — 85025 COMPLETE CBC W/AUTO DIFF WBC: CPT | Performed by: STUDENT IN AN ORGANIZED HEALTH CARE EDUCATION/TRAINING PROGRAM

## 2022-11-05 PROCEDURE — 25000003 PHARM REV CODE 250: Performed by: STUDENT IN AN ORGANIZED HEALTH CARE EDUCATION/TRAINING PROGRAM

## 2022-11-05 PROCEDURE — 20600001 HC STEP DOWN PRIVATE ROOM

## 2022-11-05 PROCEDURE — 83735 ASSAY OF MAGNESIUM: CPT | Performed by: STUDENT IN AN ORGANIZED HEALTH CARE EDUCATION/TRAINING PROGRAM

## 2022-11-05 PROCEDURE — 80053 COMPREHEN METABOLIC PANEL: CPT | Performed by: STUDENT IN AN ORGANIZED HEALTH CARE EDUCATION/TRAINING PROGRAM

## 2022-11-05 PROCEDURE — 36415 COLL VENOUS BLD VENIPUNCTURE: CPT | Performed by: STUDENT IN AN ORGANIZED HEALTH CARE EDUCATION/TRAINING PROGRAM

## 2022-11-05 PROCEDURE — 25000003 PHARM REV CODE 250

## 2022-11-05 PROCEDURE — 84100 ASSAY OF PHOSPHORUS: CPT | Performed by: STUDENT IN AN ORGANIZED HEALTH CARE EDUCATION/TRAINING PROGRAM

## 2022-11-05 RX ORDER — DEXTROSE MONOHYDRATE, SODIUM CHLORIDE, AND POTASSIUM CHLORIDE 50; 1.49; 4.5 G/1000ML; G/1000ML; G/1000ML
INJECTION, SOLUTION INTRAVENOUS CONTINUOUS
Status: DISCONTINUED | OUTPATIENT
Start: 2022-11-05 | End: 2022-11-07

## 2022-11-05 RX ADMIN — OXYCODONE 5 MG: 5 TABLET ORAL at 03:11

## 2022-11-05 RX ADMIN — POTASSIUM BICARBONATE 25 MEQ: 977.5 TABLET, EFFERVESCENT ORAL at 10:11

## 2022-11-05 RX ADMIN — Medication 6 MG: at 08:11

## 2022-11-05 RX ADMIN — PIPERACILLIN SODIUM AND TAZOBACTAM SODIUM 4.5 G: 4; .5 INJECTION, POWDER, LYOPHILIZED, FOR SOLUTION INTRAVENOUS at 08:11

## 2022-11-05 RX ADMIN — ACETAMINOPHEN 650 MG: 325 TABLET ORAL at 12:11

## 2022-11-05 RX ADMIN — SODIUM CHLORIDE: 0.9 INJECTION, SOLUTION INTRAVENOUS at 12:11

## 2022-11-05 RX ADMIN — OXYCODONE 5 MG: 5 TABLET ORAL at 08:11

## 2022-11-05 RX ADMIN — POTASSIUM BICARBONATE 25 MEQ: 977.5 TABLET, EFFERVESCENT ORAL at 02:11

## 2022-11-05 RX ADMIN — DEXTROSE, SODIUM CHLORIDE, AND POTASSIUM CHLORIDE: 5; .45; .15 INJECTION INTRAVENOUS at 10:11

## 2022-11-05 RX ADMIN — ENOXAPARIN SODIUM 40 MG: 100 INJECTION SUBCUTANEOUS at 04:11

## 2022-11-05 RX ADMIN — PIPERACILLIN SODIUM AND TAZOBACTAM SODIUM 4.5 G: 4; .5 INJECTION, POWDER, LYOPHILIZED, FOR SOLUTION INTRAVENOUS at 02:11

## 2022-11-05 RX ADMIN — ACETAMINOPHEN 650 MG: 325 TABLET ORAL at 08:11

## 2022-11-05 NOTE — PROGRESS NOTES
[facility]  General Surgery  Progress Note    Subjective:     History of Present Illness:  Duglas Us is a 62 y.o. male with no significant past medical history who presented to the ED with bilateral lower quadrant abdominal pain that started Saturday night. It has been intermittent but the episodes have been more severe which prompted him to come to the ED today. Reports associated anorexia and chills but no fevers, nausea, vomiting, or diarrhea. Upon arrival he is AF and HDS. Labs show elevated WBC at 19.7 and anemia with Hgb of 12.6. CT showed phelgmon in the RLQ without definitive visualization of the appendix. He is not currently having any pain.      Post-Op Info:  * No surgery found *         Interval History: No acute events overnight.  Febrile to Tmax of 103.1, now at 99.4. Continues on zosyn. Minimal abdominal discomfort. No nausea. Complains of some bladder pain/pressure after voiding, likely related to RLQ inflammation.     Medications:  Continuous Infusions:   sodium chloride 0.9% 125 mL/hr at 11/05/22 0005     Scheduled Meds:   enoxaparin  40 mg Subcutaneous Daily    piperacillin-tazobactam (ZOSYN) IVPB  4.5 g Intravenous Q8H     PRN Meds:acetaminophen, melatonin, ondansetron, oxyCODONE, sodium chloride 0.9%     Review of patient's allergies indicates:  No Known Allergies  Objective:     Vital Signs (Most Recent):  Temp: 99.4 °F (37.4 °C) (11/05/22 0343)  Pulse: 88 (11/05/22 0343)  Resp: 20 (11/05/22 0343)  BP: (!) 142/75 (11/05/22 0343)  SpO2: 96 % (11/05/22 0343)   Vital Signs (24h Range):  Temp:  [98.4 °F (36.9 °C)-103.1 °F (39.5 °C)] 99.4 °F (37.4 °C)  Pulse:  [] 88  Resp:  [16-20] 20  SpO2:  [94 %-99 %] 96 %  BP: (136-160)/(75-91) 142/75     Weight: 87 kg (191 lb 12.8 oz)  Body mass index is 28.32 kg/m².    Intake/Output - Last 3 Shifts         11/03 0700 11/04 0659 11/04 0700 11/05 0659 11/05 0700 11/06 0659    P.O.  160     IV Piggyback 50 91.6     Total Intake(mL/kg) 50  251.6 (2.9)     Urine (mL/kg/hr)  150 (0.1) 225 (1)    Total Output  150 225    Net +50 +101.6 -225           Stool Occurrence  1 x             Physical Exam  Vitals and nursing note reviewed.   Constitutional:       Appearance: He is well-developed. He is not ill-appearing.   Neck:      Vascular: No JVD.      Trachea: No tracheal deviation.   Cardiovascular:      Rate and Rhythm: Normal rate and regular rhythm.   Pulmonary:      Effort: Pulmonary effort is normal. No respiratory distress.      Comments: Room air  Abdominal:      General: There is no distension.      Palpations: Abdomen is soft.      Tenderness: There is no abdominal tenderness. There is no guarding.      Comments: Central adiposity  Abdomen non-distended and soft.   Minimally tender to palpation in the suprapubic region and RLQ   Skin:     General: Skin is warm and dry.   Neurological:      Mental Status: He is alert and oriented to person, place, and time.       Significant Labs:  I have reviewed all pertinent lab results within the past 24 hours.  CBC:   Recent Labs   Lab 11/05/22  0550   WBC 17.81*   RBC 4.26*   HGB 12.5*   HCT 34.8*      MCV 82   MCH 29.3   MCHC 35.9     CMP:   Recent Labs   Lab 11/05/22  0550   GLU 87   CALCIUM 8.2*   ALBUMIN 2.7*   PROT 6.7      K 3.4*   CO2 24      BUN 12   CREATININE 1.1   ALKPHOS 65   ALT 19   AST 24   BILITOT 1.7*       Significant Diagnostics:  I have reviewed all pertinent imaging results/findings within the past 24 hours.    Assessment/Plan:     * Acute appendicitis with perforation and localized peritonitis, without abscess or gangrene  Duglas Us is a 62 y.o. male admitted 11/4/22 with 6 days of abdominal pain, fever and leukocytosis. CT showed RLQ phlegmon, no discrete abscess or contained perforation. Most likely reaction from perforated appendicitis.     Clinically improving on IV antibiotics.    - advance to clear liquid diet  - mIVF  - Zosyn  - MM pain meds   - PRN nausea  meds  - Daily labs  - SCDs, lovenox    Dispo: will monitor leukocytosis and continue IV abx. Once able to advance to a regular diet, will switch to po abx and will send home. Plan will be for interval appendectomy in 6-8weeks.         Samaria Hutchins MD  General Surgery  [facility]

## 2022-11-05 NOTE — PLAN OF CARE
POC reviewed with pt, verbalized understanding. AAOx4, VSS on RA, low grade fever. Pain managed with scheduled and PRN meds. MIVF infusing at 50cc/hr, IV ABX given. Diet advanced to clears, tolerating well. Ambulates independently, up to BR and ambulating in halls.   All needs met, no complaints offered at this time. Bed locked in lowest position, call bell within reach. Frequent rounds for safety.     Problem: Adult Inpatient Plan of Care  Goal: Plan of Care Review  Outcome: Ongoing, Progressing  Goal: Patient-Specific Goal (Individualized)  Outcome: Ongoing, Progressing  Goal: Absence of Hospital-Acquired Illness or Injury  Outcome: Ongoing, Progressing  Goal: Optimal Comfort and Wellbeing  Outcome: Ongoing, Progressing  Goal: Readiness for Transition of Care  Outcome: Ongoing, Progressing     Problem: Pain Acute  Goal: Acceptable Pain Control and Functional Ability  Outcome: Ongoing, Progressing     Problem: Fever  Goal: Body Temperature in Desired Range  Outcome: Ongoing, Progressing

## 2022-11-05 NOTE — SUBJECTIVE & OBJECTIVE
Interval History: No acute events overnight.  Febrile to Tmax of 103.1, now at 99.4. Continues on zosyn. Minimal abdominal discomfort. No nausea. Complains of some bladder pain/pressure after voiding, likely related to RLQ inflammation.     Medications:  Continuous Infusions:   sodium chloride 0.9% 125 mL/hr at 11/05/22 0005     Scheduled Meds:   enoxaparin  40 mg Subcutaneous Daily    piperacillin-tazobactam (ZOSYN) IVPB  4.5 g Intravenous Q8H     PRN Meds:acetaminophen, melatonin, ondansetron, oxyCODONE, sodium chloride 0.9%     Review of patient's allergies indicates:  No Known Allergies  Objective:     Vital Signs (Most Recent):  Temp: 99.4 °F (37.4 °C) (11/05/22 0343)  Pulse: 88 (11/05/22 0343)  Resp: 20 (11/05/22 0343)  BP: (!) 142/75 (11/05/22 0343)  SpO2: 96 % (11/05/22 0343)   Vital Signs (24h Range):  Temp:  [98.4 °F (36.9 °C)-103.1 °F (39.5 °C)] 99.4 °F (37.4 °C)  Pulse:  [] 88  Resp:  [16-20] 20  SpO2:  [94 %-99 %] 96 %  BP: (136-160)/(75-91) 142/75     Weight: 87 kg (191 lb 12.8 oz)  Body mass index is 28.32 kg/m².    Intake/Output - Last 3 Shifts         11/03 0700 11/04 0659 11/04 0700 11/05 0659 11/05 0700 11/06 0659    P.O.  160     IV Piggyback 50 91.6     Total Intake(mL/kg) 50 251.6 (2.9)     Urine (mL/kg/hr)  150 (0.1) 225 (1)    Total Output  150 225    Net +50 +101.6 -225           Stool Occurrence  1 x             Physical Exam  Vitals and nursing note reviewed.   Constitutional:       Appearance: He is well-developed. He is not ill-appearing.   Neck:      Vascular: No JVD.      Trachea: No tracheal deviation.   Cardiovascular:      Rate and Rhythm: Normal rate and regular rhythm.   Pulmonary:      Effort: Pulmonary effort is normal. No respiratory distress.      Comments: Room air  Abdominal:      General: There is no distension.      Palpations: Abdomen is soft.      Tenderness: There is no abdominal tenderness. There is no guarding.      Comments: Central adiposity  Abdomen  non-distended and soft.   Minimally tender to palpation in the suprapubic region and RLQ   Skin:     General: Skin is warm and dry.   Neurological:      Mental Status: He is alert and oriented to person, place, and time.       Significant Labs:  I have reviewed all pertinent lab results within the past 24 hours.  CBC:   Recent Labs   Lab 11/05/22  0550   WBC 17.81*   RBC 4.26*   HGB 12.5*   HCT 34.8*      MCV 82   MCH 29.3   MCHC 35.9     CMP:   Recent Labs   Lab 11/05/22  0550   GLU 87   CALCIUM 8.2*   ALBUMIN 2.7*   PROT 6.7      K 3.4*   CO2 24      BUN 12   CREATININE 1.1   ALKPHOS 65   ALT 19   AST 24   BILITOT 1.7*       Significant Diagnostics:  I have reviewed all pertinent imaging results/findings within the past 24 hours.

## 2022-11-05 NOTE — CARE UPDATE
"RAPID RESPONSE NURSE CHART REVIEW        Chart Reviewed: 11/05/2022, 1:45 AM    MRN: [id number]  Bed: 1058/1058 A    Dx: Acute appendicitis with perforation and localized peritonitis, without abscess or gangrene    Duglas Us has no past medical history on file.    Last VS: BP (!) 153/91 (BP Location: Left arm, Patient Position: Lying)   Pulse 100   Temp (!) 101.9 °F (38.8 °C) (Oral)   Resp 20   Ht 5' 9" (1.753 m)   Wt 87 kg (191 lb 12.8 oz)   SpO2 95%   BMI 28.32 kg/m²     24H Vital Sign Range:  Temp:  [98.4 °F (36.9 °C)-103.1 °F (39.5 °C)]   Pulse:  []   Resp:  [16-20]   BP: (132-161)/(72-93)   SpO2:  [94 %-99 %]     Level of Consciousness (AVPU): alert    Recent Labs     11/04/22  0116 11/04/22  0155   WBC  --  19.69*   HGB  --  12.6*   HCT 41 33.6*   PLT  --  189       Recent Labs     11/04/22  0110   *   K 3.7      CO2 22*   CREATININE 1.1   *        No results for input(s): PH, PCO2, PO2, HCO3, POCSATURATED, BE in the last 72 hours.     OXYGEN:        O2 Device (Oxygen Therapy): room air    MEWS score: 4    Charge Esther JACKSON  contacted for fever, resolved with Tylenol. No additional concerns verbalized at this time. Instructed to call [phone] for further concerns or assistance.    Salena Dc RN       "

## 2022-11-05 NOTE — ASSESSMENT & PLAN NOTE
Duglas Us is a 62 y.o. male admitted 11/4/22 with 6 days of abdominal pain, fever and leukocytosis. CT showed RLQ phlegmon, no discrete abscess or contained perforation. Most likely reaction from perforated appendicitis.     Clinically improving on IV antibiotics.    - advance to clear liquid diet  - mIVF  - Zosyn  - MM pain meds   - PRN nausea meds  - Daily labs  - SCDs, lovenox    Dispo: will monitor leukocytosis and continue IV abx. Once able to advance to a regular diet, will switch to po abx and will send home. Plan will be for interval appendectomy in 6-8weeks.

## 2022-11-05 NOTE — PLAN OF CARE
T max 103.1 orally .  On call physician informed and no new orders obtained .Gave Tylenol twice with sips water . Continue NS @ 125 ml/hr and IV Zosyn . Educated him on reason for NPO and reviewed meds and plan of care . Max pain level with activity was reported as 6 to 7 at LLQ of abd ,but no rebound tenderness or guarding noted . He described pains as severe cramps that come in waves. Prn meds adequate for pain relief at this time . Educated on equipment and fall prevention . He adhered to safety measures  . PIV x2 in place . AM labs drawn . Continue care   Problem: Adult Inpatient Plan of Care  Goal: Plan of Care Review  Outcome: Ongoing, Progressing  Goal: Patient-Specific Goal (Individualized)  Outcome: Ongoing, Progressing  Goal: Absence of Hospital-Acquired Illness or Injury  Outcome: Ongoing, Progressing  Goal: Optimal Comfort and Wellbeing  Outcome: Ongoing, Progressing  Goal: Readiness for Transition of Care  Outcome: Ongoing, Progressing     Problem: Pain Acute  Goal: Acceptable Pain Control and Functional Ability  Outcome: Ongoing, Progressing     Problem: Fever  Goal: Body Temperature in Desired Range  Outcome: Ongoing, Progressing

## 2022-11-06 LAB
ALBUMIN SERPL BCP-MCNC: 2.9 G/DL (ref 3.5–5.2)
ALP SERPL-CCNC: 62 U/L (ref 55–135)
ALT SERPL W/O P-5'-P-CCNC: 22 U/L (ref 10–44)
ANION GAP SERPL CALC-SCNC: 11 MMOL/L (ref 8–16)
ANISOCYTOSIS BLD QL SMEAR: SLIGHT
AST SERPL-CCNC: 30 U/L (ref 10–40)
BASOPHILS # BLD AUTO: 0.03 K/UL (ref 0–0.2)
BASOPHILS # BLD AUTO: 0.04 K/UL (ref 0–0.2)
BASOPHILS NFR BLD: 0.2 % (ref 0–1.9)
BASOPHILS NFR BLD: 0.3 % (ref 0–1.9)
BILIRUB SERPL-MCNC: 1.1 MG/DL (ref 0.1–1)
BUN SERPL-MCNC: 16 MG/DL (ref 8–23)
CALCIUM SERPL-MCNC: 9.3 MG/DL (ref 8.7–10.5)
CHLORIDE SERPL-SCNC: 104 MMOL/L (ref 95–110)
CO2 SERPL-SCNC: 24 MMOL/L (ref 23–29)
CREAT SERPL-MCNC: 1.3 MG/DL (ref 0.5–1.4)
DIFFERENTIAL METHOD: ABNORMAL
DIFFERENTIAL METHOD: ABNORMAL
EOSINOPHIL # BLD AUTO: 0.2 K/UL (ref 0–0.5)
EOSINOPHIL # BLD AUTO: 0.3 K/UL (ref 0–0.5)
EOSINOPHIL NFR BLD: 1.3 % (ref 0–8)
EOSINOPHIL NFR BLD: 2 % (ref 0–8)
ERYTHROCYTE [DISTWIDTH] IN BLOOD BY AUTOMATED COUNT: 13.4 % (ref 11.5–14.5)
ERYTHROCYTE [DISTWIDTH] IN BLOOD BY AUTOMATED COUNT: 13.4 % (ref 11.5–14.5)
EST. GFR  (NO RACE VARIABLE): >60 ML/MIN/1.73 M^2
GLUCOSE SERPL-MCNC: 115 MG/DL (ref 70–110)
HCT VFR BLD AUTO: 35.3 % (ref 40–54)
HCT VFR BLD AUTO: 39.1 % (ref 40–54)
HGB BLD-MCNC: 12.6 G/DL (ref 14–18)
HGB BLD-MCNC: 14 G/DL (ref 14–18)
IMM GRANULOCYTES # BLD AUTO: 0.07 K/UL (ref 0–0.04)
IMM GRANULOCYTES # BLD AUTO: 0.11 K/UL (ref 0–0.04)
IMM GRANULOCYTES NFR BLD AUTO: 0.5 % (ref 0–0.5)
IMM GRANULOCYTES NFR BLD AUTO: 0.6 % (ref 0–0.5)
LYMPHOCYTES # BLD AUTO: 1.5 K/UL (ref 1–4.8)
LYMPHOCYTES # BLD AUTO: 1.8 K/UL (ref 1–4.8)
LYMPHOCYTES NFR BLD: 13.6 % (ref 18–48)
LYMPHOCYTES NFR BLD: 8.6 % (ref 18–48)
MAGNESIUM SERPL-MCNC: 1.9 MG/DL (ref 1.6–2.6)
MCH RBC QN AUTO: 29.2 PG (ref 27–31)
MCH RBC QN AUTO: 29.4 PG (ref 27–31)
MCHC RBC AUTO-ENTMCNC: 35.7 G/DL (ref 32–36)
MCHC RBC AUTO-ENTMCNC: 35.8 G/DL (ref 32–36)
MCV RBC AUTO: 82 FL (ref 82–98)
MCV RBC AUTO: 82 FL (ref 82–98)
MONOCYTES # BLD AUTO: 1 K/UL (ref 0.3–1)
MONOCYTES # BLD AUTO: 1.2 K/UL (ref 0.3–1)
MONOCYTES NFR BLD: 7.2 % (ref 4–15)
MONOCYTES NFR BLD: 7.5 % (ref 4–15)
NEUTROPHILS # BLD AUTO: 10.3 K/UL (ref 1.8–7.7)
NEUTROPHILS # BLD AUTO: 14 K/UL (ref 1.8–7.7)
NEUTROPHILS NFR BLD: 76.1 % (ref 38–73)
NEUTROPHILS NFR BLD: 82.1 % (ref 38–73)
NRBC BLD-RTO: 0 /100 WBC
NRBC BLD-RTO: 0 /100 WBC
PHOSPHATE SERPL-MCNC: 2.5 MG/DL (ref 2.7–4.5)
PLATELET # BLD AUTO: 212 K/UL (ref 150–450)
PLATELET # BLD AUTO: 218 K/UL (ref 150–450)
PLATELET BLD QL SMEAR: ABNORMAL
PMV BLD AUTO: 11.8 FL (ref 9.2–12.9)
PMV BLD AUTO: 12.2 FL (ref 9.2–12.9)
POTASSIUM SERPL-SCNC: 3.5 MMOL/L (ref 3.5–5.1)
PROT SERPL-MCNC: 7.3 G/DL (ref 6–8.4)
RBC # BLD AUTO: 4.31 M/UL (ref 4.6–6.2)
RBC # BLD AUTO: 4.76 M/UL (ref 4.6–6.2)
SODIUM SERPL-SCNC: 139 MMOL/L (ref 136–145)
WBC # BLD AUTO: 13.48 K/UL (ref 3.9–12.7)
WBC # BLD AUTO: 17.05 K/UL (ref 3.9–12.7)

## 2022-11-06 PROCEDURE — 85025 COMPLETE CBC W/AUTO DIFF WBC: CPT | Performed by: STUDENT IN AN ORGANIZED HEALTH CARE EDUCATION/TRAINING PROGRAM

## 2022-11-06 PROCEDURE — 25000003 PHARM REV CODE 250: Performed by: STUDENT IN AN ORGANIZED HEALTH CARE EDUCATION/TRAINING PROGRAM

## 2022-11-06 PROCEDURE — 36415 COLL VENOUS BLD VENIPUNCTURE: CPT

## 2022-11-06 PROCEDURE — 20600001 HC STEP DOWN PRIVATE ROOM

## 2022-11-06 PROCEDURE — 63600175 PHARM REV CODE 636 W HCPCS: Performed by: STUDENT IN AN ORGANIZED HEALTH CARE EDUCATION/TRAINING PROGRAM

## 2022-11-06 PROCEDURE — 99232 SBSQ HOSP IP/OBS MODERATE 35: CPT | Mod: ,,, | Performed by: SURGERY

## 2022-11-06 PROCEDURE — 99232 PR SUBSEQUENT HOSPITAL CARE,LEVL II: ICD-10-PCS | Mod: ,,, | Performed by: SURGERY

## 2022-11-06 PROCEDURE — 84100 ASSAY OF PHOSPHORUS: CPT | Performed by: STUDENT IN AN ORGANIZED HEALTH CARE EDUCATION/TRAINING PROGRAM

## 2022-11-06 PROCEDURE — 25000003 PHARM REV CODE 250

## 2022-11-06 PROCEDURE — 83735 ASSAY OF MAGNESIUM: CPT | Performed by: STUDENT IN AN ORGANIZED HEALTH CARE EDUCATION/TRAINING PROGRAM

## 2022-11-06 PROCEDURE — 85025 COMPLETE CBC W/AUTO DIFF WBC: CPT | Mod: 91

## 2022-11-06 PROCEDURE — 80053 COMPREHEN METABOLIC PANEL: CPT | Performed by: STUDENT IN AN ORGANIZED HEALTH CARE EDUCATION/TRAINING PROGRAM

## 2022-11-06 PROCEDURE — 36415 COLL VENOUS BLD VENIPUNCTURE: CPT | Performed by: STUDENT IN AN ORGANIZED HEALTH CARE EDUCATION/TRAINING PROGRAM

## 2022-11-06 RX ORDER — SODIUM,POTASSIUM PHOSPHATES 280-250MG
2 POWDER IN PACKET (EA) ORAL ONCE
Status: COMPLETED | OUTPATIENT
Start: 2022-11-06 | End: 2022-11-06

## 2022-11-06 RX ORDER — SIMETHICONE 80 MG
1 TABLET,CHEWABLE ORAL 3 TIMES DAILY PRN
Status: DISCONTINUED | OUTPATIENT
Start: 2022-11-06 | End: 2022-11-07 | Stop reason: HOSPADM

## 2022-11-06 RX ADMIN — ENOXAPARIN SODIUM 40 MG: 100 INJECTION SUBCUTANEOUS at 05:11

## 2022-11-06 RX ADMIN — PIPERACILLIN SODIUM AND TAZOBACTAM SODIUM 4.5 G: 4; .5 INJECTION, POWDER, LYOPHILIZED, FOR SOLUTION INTRAVENOUS at 12:11

## 2022-11-06 RX ADMIN — OXYCODONE 5 MG: 5 TABLET ORAL at 10:11

## 2022-11-06 RX ADMIN — PIPERACILLIN SODIUM AND TAZOBACTAM SODIUM 4.5 G: 4; .5 INJECTION, POWDER, LYOPHILIZED, FOR SOLUTION INTRAVENOUS at 02:11

## 2022-11-06 RX ADMIN — PIPERACILLIN SODIUM AND TAZOBACTAM SODIUM 4.5 G: 4; .5 INJECTION, POWDER, LYOPHILIZED, FOR SOLUTION INTRAVENOUS at 07:11

## 2022-11-06 RX ADMIN — POTASSIUM & SODIUM PHOSPHATES POWDER PACK 280-160-250 MG 2 PACKET: 280-160-250 PACK at 10:11

## 2022-11-06 RX ADMIN — ACETAMINOPHEN 650 MG: 325 TABLET ORAL at 11:11

## 2022-11-06 RX ADMIN — DEXTROSE, SODIUM CHLORIDE, AND POTASSIUM CHLORIDE: 5; .45; .15 INJECTION INTRAVENOUS at 05:11

## 2022-11-06 NOTE — PROGRESS NOTES
[facility]  General Surgery  Progress Note    Subjective:     History of Present Illness:  Duglas Us is a 62 y.o. male with no significant past medical history who presented to the ED with bilateral lower quadrant abdominal pain that started Saturday night. It has been intermittent but the episodes have been more severe which prompted him to come to the ED today. Reports associated anorexia and chills but no fevers, nausea, vomiting, or diarrhea. Upon arrival he is AF and HDS. Labs show elevated WBC at 19.7 and anemia with Hgb of 12.6. CT showed phelgmon in the RLQ without definitive visualization of the appendix. He is not currently having any pain.      Post-Op Info:  * No surgery found *         Interval History:   No acute events overnight. WBC 17 from 17. Pain has resolved. Tolerating CLD.     Medications:  Continuous Infusions:   dextrose 5 % and 0.45 % NaCl with KCl 20 mEq 50 mL/hr at 11/06/22 0540     Scheduled Meds:   enoxaparin  40 mg Subcutaneous Daily    piperacillin-tazobactam (ZOSYN) IVPB  4.5 g Intravenous Q8H     PRN Meds:acetaminophen, melatonin, ondansetron, oxyCODONE, simethicone, sodium chloride 0.9%     Review of patient's allergies indicates:  No Known Allergies  Objective:     Vital Signs (Most Recent):  Temp: 98.1 °F (36.7 °C) (11/06/22 0731)  Pulse: 107 (11/06/22 0731)  Resp: 18 (11/06/22 0731)  BP: (!) 140/84 (11/06/22 0731)  SpO2: 97 % (11/06/22 0731)   Vital Signs (24h Range):  Temp:  [96.6 °F (35.9 °C)-100.1 °F (37.8 °C)] 98.1 °F (36.7 °C)  Pulse:  [] 107  Resp:  [16-18] 18  SpO2:  [96 %-99 %] 97 %  BP: (133-165)/(84-94) 140/84     Weight: 87 kg (191 lb 12.8 oz)  Body mass index is 28.32 kg/m².    Intake/Output - Last 3 Shifts         11/04 0700 11/05 0659 11/05 0700 11/06 0659 11/06 0700  11/07 0659    P.O. 160 120     IV Piggyback 91.6      Total Intake(mL/kg) 251.6 (2.9) 120 (1.4)     Urine (mL/kg/hr) 150 (0.1) 225 (0.1)     Stool  0     Total Output 150 225      Net +101.6 -105            Urine Occurrence  2 x     Stool Occurrence 1 x 1 x             Physical Exam  Vitals and nursing note reviewed.   Constitutional:       Appearance: He is well-developed. He is not ill-appearing.   Neck:      Vascular: No JVD.      Trachea: No tracheal deviation.   Cardiovascular:      Rate and Rhythm: Normal rate and regular rhythm.   Pulmonary:      Effort: Pulmonary effort is normal. No respiratory distress.      Comments: Room air  Abdominal:      General: There is no distension.      Palpations: Abdomen is soft.      Tenderness: There is no abdominal tenderness. There is no guarding.      Comments: Central adiposity  Abdomen non-distended and soft.    Skin:     General: Skin is warm and dry.   Neurological:      Mental Status: He is alert and oriented to person, place, and time.       Significant Labs:  I have reviewed all pertinent lab results within the past 24 hours.  CBC:   Recent Labs   Lab 11/06/22  0513   WBC 17.05*   RBC 4.76   HGB 14.0   HCT 39.1*      MCV 82   MCH 29.4   MCHC 35.8     CMP:   Recent Labs   Lab 11/06/22  0513   *   CALCIUM 9.3   ALBUMIN 2.9*   PROT 7.3      K 3.5   CO2 24      BUN 16   CREATININE 1.3   ALKPHOS 62   ALT 22   AST 30   BILITOT 1.1*       Significant Diagnostics:  I have reviewed all pertinent imaging results/findings within the past 24 hours.    Assessment/Plan:     * Acute appendicitis with perforation and localized peritonitis, without abscess or gangrene  Duglas Us is a 62 y.o. male admitted 11/4/22 with 6 days of abdominal pain, fever and leukocytosis. CT showed RLQ phlegmon, no discrete abscess or contained perforation. Most likely reaction from perforated appendicitis.     Clinically improving on IV antibiotics.    - CLD  - repeat CBC at 1600; if WBC is stable will consider changing abx regimen   - mIVF  - Zosyn, see above  - MM pain meds   - PRN nausea meds  - Daily labs  - SCDs, lovenox    Dispo: will  monitor leukocytosis and continue IV abx. Once able to advance to a regular diet, will switch to po abx and will send home. Plan will be for interval appendectomy in 6-8weeks.         Tricia Muro MD  General Surgery  [facility]

## 2022-11-06 NOTE — PLAN OF CARE
POC reviewed with pt, verbalized understanding. AAOx4, VSS on RA, low grade fever. Pain managed with scheduled and PRN meds. MIVF infusing at 50cc/hr, IV ABX given. Clear diet, tolerating well. Ambulates independently, up to BR and ambulating in halls.   All needs met, no complaints offered at this time. Bed locked in lowest position, call bell within reach. Frequent rounds for safety.      Problem: Adult Inpatient Plan of Care  Goal: Plan of Care Review  Outcome: Ongoing, Progressing  Goal: Patient-Specific Goal (Individualized)  Outcome: Ongoing, Progressing  Goal: Absence of Hospital-Acquired Illness or Injury  Outcome: Ongoing, Progressing  Goal: Optimal Comfort and Wellbeing  Outcome: Ongoing, Progressing  Goal: Readiness for Transition of Care  Outcome: Ongoing, Progressing     Problem: Pain Acute  Goal: Acceptable Pain Control and Functional Ability  Outcome: Ongoing, Progressing     Problem: Fever  Goal: Body Temperature in Desired Range  Outcome: Ongoing, Progressing

## 2022-11-06 NOTE — SUBJECTIVE & OBJECTIVE
Interval History:   No acute events overnight. WBC 17 from 17. Pain has resolved. Tolerating CLD.     Medications:  Continuous Infusions:   dextrose 5 % and 0.45 % NaCl with KCl 20 mEq 50 mL/hr at 11/06/22 0540     Scheduled Meds:   enoxaparin  40 mg Subcutaneous Daily    piperacillin-tazobactam (ZOSYN) IVPB  4.5 g Intravenous Q8H     PRN Meds:acetaminophen, melatonin, ondansetron, oxyCODONE, simethicone, sodium chloride 0.9%     Review of patient's allergies indicates:  No Known Allergies  Objective:     Vital Signs (Most Recent):  Temp: 98.1 °F (36.7 °C) (11/06/22 0731)  Pulse: 107 (11/06/22 0731)  Resp: 18 (11/06/22 0731)  BP: (!) 140/84 (11/06/22 0731)  SpO2: 97 % (11/06/22 0731)   Vital Signs (24h Range):  Temp:  [96.6 °F (35.9 °C)-100.1 °F (37.8 °C)] 98.1 °F (36.7 °C)  Pulse:  [] 107  Resp:  [16-18] 18  SpO2:  [96 %-99 %] 97 %  BP: (133-165)/(84-94) 140/84     Weight: 87 kg (191 lb 12.8 oz)  Body mass index is 28.32 kg/m².    Intake/Output - Last 3 Shifts         11/04 0700 11/05 0659 11/05 0700 11/06 0659 11/06 0700 11/07 0659    P.O. 160 120     IV Piggyback 91.6      Total Intake(mL/kg) 251.6 (2.9) 120 (1.4)     Urine (mL/kg/hr) 150 (0.1) 225 (0.1)     Stool  0     Total Output 150 225     Net +101.6 -105            Urine Occurrence  2 x     Stool Occurrence 1 x 1 x             Physical Exam  Vitals and nursing note reviewed.   Constitutional:       Appearance: He is well-developed. He is not ill-appearing.   Neck:      Vascular: No JVD.      Trachea: No tracheal deviation.   Cardiovascular:      Rate and Rhythm: Normal rate and regular rhythm.   Pulmonary:      Effort: Pulmonary effort is normal. No respiratory distress.      Comments: Room air  Abdominal:      General: There is no distension.      Palpations: Abdomen is soft.      Tenderness: There is no abdominal tenderness. There is no guarding.      Comments: Central adiposity  Abdomen non-distended and soft.    Skin:     General: Skin is warm  and dry.   Neurological:      Mental Status: He is alert and oriented to person, place, and time.       Significant Labs:  I have reviewed all pertinent lab results within the past 24 hours.  CBC:   Recent Labs   Lab 11/06/22 0513   WBC 17.05*   RBC 4.76   HGB 14.0   HCT 39.1*      MCV 82   MCH 29.4   MCHC 35.8     CMP:   Recent Labs   Lab 11/06/22 0513   *   CALCIUM 9.3   ALBUMIN 2.9*   PROT 7.3      K 3.5   CO2 24      BUN 16   CREATININE 1.3   ALKPHOS 62   ALT 22   AST 30   BILITOT 1.1*       Significant Diagnostics:  I have reviewed all pertinent imaging results/findings within the past 24 hours.

## 2022-11-06 NOTE — ASSESSMENT & PLAN NOTE
Duglas Us is a 62 y.o. male admitted 11/4/22 with 6 days of abdominal pain, fever and leukocytosis. CT showed RLQ phlegmon, no discrete abscess or contained perforation. Most likely reaction from perforated appendicitis.     Clinically improving on IV antibiotics.    - CLD  - repeat CBC at 1600; if WBC is stable will consider changing abx regimen   - mIVF  - Zosyn, see above  - MM pain meds   - PRN nausea meds  - Daily labs  - SCDs, lovenox    Dispo: will monitor leukocytosis and continue IV abx. Once able to advance to a regular diet, will switch to po abx and will send home. Plan will be for interval appendectomy in 6-8weeks.

## 2022-11-07 VITALS
DIASTOLIC BLOOD PRESSURE: 95 MMHG | RESPIRATION RATE: 18 BRPM | SYSTOLIC BLOOD PRESSURE: 165 MMHG | HEART RATE: 82 BPM | HEIGHT: 69 IN | TEMPERATURE: 99 F | WEIGHT: 191.81 LBS | BODY MASS INDEX: 28.41 KG/M2 | OXYGEN SATURATION: 97 %

## 2022-11-07 LAB
ALBUMIN SERPL BCP-MCNC: 2.9 G/DL (ref 3.5–5.2)
ALP SERPL-CCNC: 52 U/L (ref 55–135)
ALT SERPL W/O P-5'-P-CCNC: 24 U/L (ref 10–44)
ANION GAP SERPL CALC-SCNC: 11 MMOL/L (ref 8–16)
ANISOCYTOSIS BLD QL SMEAR: SLIGHT
AST SERPL-CCNC: 32 U/L (ref 10–40)
BASOPHILS # BLD AUTO: 0.02 K/UL (ref 0–0.2)
BASOPHILS NFR BLD: 0.2 % (ref 0–1.9)
BILIRUB SERPL-MCNC: 0.9 MG/DL (ref 0.1–1)
BUN SERPL-MCNC: 14 MG/DL (ref 8–23)
CALCIUM SERPL-MCNC: 8.8 MG/DL (ref 8.7–10.5)
CHLORIDE SERPL-SCNC: 103 MMOL/L (ref 95–110)
CO2 SERPL-SCNC: 22 MMOL/L (ref 23–29)
CREAT SERPL-MCNC: 1.1 MG/DL (ref 0.5–1.4)
DIFFERENTIAL METHOD: ABNORMAL
EOSINOPHIL # BLD AUTO: 0.3 K/UL (ref 0–0.5)
EOSINOPHIL NFR BLD: 2.7 % (ref 0–8)
ERYTHROCYTE [DISTWIDTH] IN BLOOD BY AUTOMATED COUNT: 13.6 % (ref 11.5–14.5)
EST. GFR  (NO RACE VARIABLE): >60 ML/MIN/1.73 M^2
GLUCOSE SERPL-MCNC: 113 MG/DL (ref 70–110)
HCT VFR BLD AUTO: 34.7 % (ref 40–54)
HGB BLD-MCNC: 12.4 G/DL (ref 14–18)
HYPOCHROMIA BLD QL SMEAR: ABNORMAL
IMM GRANULOCYTES # BLD AUTO: 0.07 K/UL (ref 0–0.04)
IMM GRANULOCYTES NFR BLD AUTO: 0.6 % (ref 0–0.5)
LYMPHOCYTES # BLD AUTO: 2.3 K/UL (ref 1–4.8)
LYMPHOCYTES NFR BLD: 19.1 % (ref 18–48)
MAGNESIUM SERPL-MCNC: 1.9 MG/DL (ref 1.6–2.6)
MCH RBC QN AUTO: 29.3 PG (ref 27–31)
MCHC RBC AUTO-ENTMCNC: 35.7 G/DL (ref 32–36)
MCV RBC AUTO: 82 FL (ref 82–98)
MONOCYTES # BLD AUTO: 1 K/UL (ref 0.3–1)
MONOCYTES NFR BLD: 8.1 % (ref 4–15)
NEUTROPHILS # BLD AUTO: 8.3 K/UL (ref 1.8–7.7)
NEUTROPHILS NFR BLD: 69.3 % (ref 38–73)
NRBC BLD-RTO: 0 /100 WBC
OVALOCYTES BLD QL SMEAR: ABNORMAL
PHOSPHATE SERPL-MCNC: 3 MG/DL (ref 2.7–4.5)
PLATELET # BLD AUTO: 229 K/UL (ref 150–450)
PMV BLD AUTO: 11.3 FL (ref 9.2–12.9)
POIKILOCYTOSIS BLD QL SMEAR: SLIGHT
POLYCHROMASIA BLD QL SMEAR: ABNORMAL
POTASSIUM SERPL-SCNC: 3.2 MMOL/L (ref 3.5–5.1)
PROT SERPL-MCNC: 6.9 G/DL (ref 6–8.4)
RBC # BLD AUTO: 4.23 M/UL (ref 4.6–6.2)
SODIUM SERPL-SCNC: 136 MMOL/L (ref 136–145)
WBC # BLD AUTO: 12.02 K/UL (ref 3.9–12.7)

## 2022-11-07 PROCEDURE — 80053 COMPREHEN METABOLIC PANEL: CPT | Performed by: STUDENT IN AN ORGANIZED HEALTH CARE EDUCATION/TRAINING PROGRAM

## 2022-11-07 PROCEDURE — 85025 COMPLETE CBC W/AUTO DIFF WBC: CPT | Performed by: STUDENT IN AN ORGANIZED HEALTH CARE EDUCATION/TRAINING PROGRAM

## 2022-11-07 PROCEDURE — 25000003 PHARM REV CODE 250

## 2022-11-07 PROCEDURE — 83735 ASSAY OF MAGNESIUM: CPT | Performed by: STUDENT IN AN ORGANIZED HEALTH CARE EDUCATION/TRAINING PROGRAM

## 2022-11-07 PROCEDURE — 63600175 PHARM REV CODE 636 W HCPCS: Performed by: STUDENT IN AN ORGANIZED HEALTH CARE EDUCATION/TRAINING PROGRAM

## 2022-11-07 PROCEDURE — 84100 ASSAY OF PHOSPHORUS: CPT | Performed by: STUDENT IN AN ORGANIZED HEALTH CARE EDUCATION/TRAINING PROGRAM

## 2022-11-07 PROCEDURE — 36415 COLL VENOUS BLD VENIPUNCTURE: CPT | Performed by: STUDENT IN AN ORGANIZED HEALTH CARE EDUCATION/TRAINING PROGRAM

## 2022-11-07 PROCEDURE — 25000003 PHARM REV CODE 250: Performed by: STUDENT IN AN ORGANIZED HEALTH CARE EDUCATION/TRAINING PROGRAM

## 2022-11-07 RX ORDER — CIPROFLOXACIN 500 MG/1
500 TABLET ORAL EVERY 12 HOURS
Qty: 8 TABLET | Refills: 0 | Status: SHIPPED | OUTPATIENT
Start: 2022-11-07 | End: 2022-11-11

## 2022-11-07 RX ORDER — POLYETHYLENE GLYCOL 3350 17 G/17G
17 POWDER, FOR SOLUTION ORAL DAILY
Qty: 238 G | Refills: 0 | Status: SHIPPED | OUTPATIENT
Start: 2022-11-07 | End: 2022-11-21

## 2022-11-07 RX ORDER — SODIUM CHLORIDE 0.9 % (FLUSH) 0.9 %
3 SYRINGE (ML) INJECTION
Status: DISCONTINUED | OUTPATIENT
Start: 2022-11-07 | End: 2022-11-07 | Stop reason: HOSPADM

## 2022-11-07 RX ORDER — METRONIDAZOLE 500 MG/1
500 TABLET ORAL EVERY 12 HOURS
Qty: 8 TABLET | Refills: 0 | Status: SHIPPED | OUTPATIENT
Start: 2022-11-07 | End: 2022-11-11

## 2022-11-07 RX ORDER — ONDANSETRON 4 MG/1
8 TABLET, ORALLY DISINTEGRATING ORAL 2 TIMES DAILY
Qty: 15 TABLET | Refills: 0 | Status: SHIPPED | OUTPATIENT
Start: 2022-11-07 | End: 2022-11-21

## 2022-11-07 RX ORDER — SODIUM CHLORIDE 9 MG/ML
INJECTION, SOLUTION INTRAVENOUS
Status: DISCONTINUED | OUTPATIENT
Start: 2022-11-07 | End: 2022-11-07 | Stop reason: HOSPADM

## 2022-11-07 RX ADMIN — PIPERACILLIN SODIUM AND TAZOBACTAM SODIUM 4.5 G: 4; .5 INJECTION, POWDER, LYOPHILIZED, FOR SOLUTION INTRAVENOUS at 12:11

## 2022-11-07 RX ADMIN — ACETAMINOPHEN 650 MG: 325 TABLET ORAL at 12:11

## 2022-11-07 RX ADMIN — SODIUM CHLORIDE: 0.9 INJECTION, SOLUTION INTRAVENOUS at 12:11

## 2022-11-07 RX ADMIN — PIPERACILLIN SODIUM AND TAZOBACTAM SODIUM 4.5 G: 4; .5 INJECTION, POWDER, LYOPHILIZED, FOR SOLUTION INTRAVENOUS at 08:11

## 2022-11-07 RX ADMIN — DEXTROSE, SODIUM CHLORIDE, AND POTASSIUM CHLORIDE: 5; .45; .15 INJECTION INTRAVENOUS at 12:11

## 2022-11-07 NOTE — PROGRESS NOTES
[facility]  General Surgery  Progress Note    Subjective:     History of Present Illness:  Duglas Us is a 62 y.o. male with no significant past medical history who presented to the ED with bilateral lower quadrant abdominal pain that started Saturday night. It has been intermittent but the episodes have been more severe which prompted him to come to the ED today. Reports associated anorexia and chills but no fevers, nausea, vomiting, or diarrhea. Upon arrival he is AF and HDS. Labs show elevated WBC at 19.7 and anemia with Hgb of 12.6. CT showed phelgmon in the RLQ without definitive visualization of the appendix. He is not currently having any pain.      Post-Op Info:  * No surgery found *         Interval History:   No acute events overnight. Up and ambulating this am. Pain well controlled. WBC continues to down trend, wnl.     Medications:  Continuous Infusions:  Scheduled Meds:   enoxaparin  40 mg Subcutaneous Daily    piperacillin-tazobactam (ZOSYN) IVPB  4.5 g Intravenous Q8H     PRN Meds:sodium chloride 0.9%, acetaminophen, melatonin, ondansetron, oxyCODONE, simethicone, sodium chloride 0.9%, sodium chloride 0.9%     Review of patient's allergies indicates:  No Known Allergies  Objective:     Vital Signs (Most Recent):  Temp: 98.2 °F (36.8 °C) (11/07/22 0334)  Pulse: 81 (11/07/22 0334)  Resp: 12 (11/07/22 0334)  BP: (!) 159/98 (11/07/22 0334)  SpO2: 99 % (11/07/22 0334)   Vital Signs (24h Range):  Temp:  [97.7 °F (36.5 °C)-99.1 °F (37.3 °C)] 98.2 °F (36.8 °C)  Pulse:  [76-88] 81  Resp:  [12-18] 12  SpO2:  [98 %-99 %] 99 %  BP: (144-163)/() 159/98     Weight: 87 kg (191 lb 12.8 oz)  Body mass index is 28.32 kg/m².    Intake/Output - Last 3 Shifts         11/05 0700 11/06 0659 11/06 0700 11/07 0659 11/07 0700 11/08 0659    P.O. 120 60     I.V. (mL/kg) 438.9 (5) 1805.4 (20.8)     IV Piggyback 570.6 302.8     Total Intake(mL/kg) 1129.4 (13) 2168.3 (24.9)     Urine (mL/kg/hr) 225 (0.1)       Stool 0      Total Output 225      Net +904.4 +2168.3            Urine Occurrence 2 x 2 x     Stool Occurrence 1 x 3 x             Physical Exam  Vitals and nursing note reviewed.   Constitutional:       Appearance: He is well-developed. He is not ill-appearing.   Neck:      Vascular: No JVD.      Trachea: No tracheal deviation.   Cardiovascular:      Rate and Rhythm: Normal rate and regular rhythm.   Pulmonary:      Effort: Pulmonary effort is normal. No respiratory distress.      Comments: Room air  Abdominal:      General: There is no distension.      Palpations: Abdomen is soft.      Tenderness: There is no abdominal tenderness. There is no guarding.      Comments: Central adiposity  Abdomen non-distended and soft.    Skin:     General: Skin is warm and dry.   Neurological:      Mental Status: He is alert and oriented to person, place, and time.       Significant Labs:  I have reviewed all pertinent lab results within the past 24 hours.  CBC:   Recent Labs   Lab 11/07/22  0416   WBC 12.02   RBC 4.23*   HGB 12.4*   HCT 34.7*      MCV 82   MCH 29.3   MCHC 35.7     CMP:   Recent Labs   Lab 11/07/22  0416   *   CALCIUM 8.8   ALBUMIN 2.9*   PROT 6.9      K 3.2*   CO2 22*      BUN 14   CREATININE 1.1   ALKPHOS 52*   ALT 24   AST 32   BILITOT 0.9       Significant Diagnostics:  I have reviewed all pertinent imaging results/findings within the past 24 hours.    Assessment/Plan:     * Acute appendicitis with perforation and localized peritonitis, without abscess or gangrene  Duglas Us is a 62 y.o. male admitted 11/4/22 with 6 days of abdominal pain, fever and leukocytosis. CT showed RLQ phlegmon, no discrete abscess or contained perforation. Most likely reaction from perforated appendicitis.     Clinically improving on IV antibiotics.    - regular diet  - mIVF  - Zosyn, see above  - MM pain meds   - PRN nausea meds  - Daily labs  - SCDs, lovenox    Dispo: if tolerating diet and feeling  well, discharge this afternoon. Will go home with 4d of PO abx and bowel reg. Plan will be for interval appendectomy in 6-8weeks.         Tricia Muro MD  General Surgery  [facility]

## 2022-11-07 NOTE — CONSULTS
This  responded to [facility] to provide Advance Directives information.  Pt informed  that everything had been taken care of yesterday. Apparently there was an issue with getting a second witness, but Pt said that everything was completed previously.

## 2022-11-07 NOTE — PLAN OF CARE
[facility]  Discharge Final Note    Primary Care Provider: Primary Doctor No    Expected Discharge Date: 11/7/2022    Final Discharge Note (most recent)       Final Note - 11/07/22 1303          Final Note    Assessment Type Final Discharge Note     Anticipated Discharge Disposition Home or Self Care     Hospital Resources/Appts/Education Provided Provided patient/caregiver with written discharge plan information        Post-Acute Status    Discharge Delays None known at this time                     Important Message from Medicare             Contact Info       Sami Sneed MD   Specialty: General Surgery    [address] [address]   Phone: [phone]       Next Steps: Follow up in 2 week(s)    Estefani Clements MD    [facility] Primary [facility] Bldg   [address] [address] [phone]       Next Steps: Follow up on 11/14/2022    Instructions: F/U appointment 7:30 AM          Pt d/c home with family. No d/c needs reported by medical team.    Esther Sherman LCSW  Case Management/[facility]  [phone]

## 2022-11-07 NOTE — NURSING
Pt tolerated lunch, 90 percent with no n/v noted. Pt's AVS/discharge instructions printed and reviewed with pt. Pt's vitals wnl, Pt verbalizes understanding of follow up appt and medication adherence. Pt verbalizes understanding of when to contact hcp for emergency s/s. Pt's car at emergency parking.

## 2022-11-07 NOTE — DISCHARGE SUMMARY
[facility]  General Surgery  Discharge Summary      Patient Name: Duglas Us  MRN: [id number]  Admission Date: 11/4/2022  Hospital Length of Stay: 3 days  Discharge Date and Time:  11/07/2022 8:22 AM  Attending Physician: Bruno Sneed MD   Discharging Provider: Tricia Muro MD  Primary Care Provider: Primary Doctor No    HPI:   Duglas Us is a 62 y.o. male with no significant past medical history who presented to the ED with bilateral lower quadrant abdominal pain that started Saturday night. It has been intermittent but the episodes have been more severe which prompted him to come to the ED today. Reports associated anorexia and chills but no fevers, nausea, vomiting, or diarrhea. Upon arrival he is AF and HDS. Labs show elevated WBC at 19.7 and anemia with Hgb of 12.6. CT showed phelgmon in the RLQ without definitive visualization of the appendix. He is not currently having any pain.      * No surgery found *      Indwelling Lines/Drains at time of discharge:   Lines/Drains/Airways     None               Hospital Course: Duglas Us is a 62 y.o. male with no significant past medical history who presented to the ED with bilateral lower quadrant abdominal pain, CT showed phelgmon in the RLQ without definitive visualization of the appendix. Treated with IV abx. WBC down trended and normalized. Patient remained afebrile throughout hospital stay. He is now ambulating without assistance, tolerating a regular diet, pain is well controlled with PO pain medication, and he is voiding appropriately. He now meets all criteria for discharge.       Goals of Care Treatment Preferences:  Code Status: Full Code    Living Will: Yes              Consults:   Consults (From admission, onward)        Status Ordering Provider     Inpatient consult to Spiritual Care  Once        Provider:  (Not yet assigned)    Completed BRUNO SNEED     Inpatient consult to General Surgery  Once        Provider:  (Not yet  assigned)    Completed ESSIE BALLARD          Significant Diagnostic Studies: Labs:   CMP   Recent Labs   Lab 11/06/22  0513 11/07/22  0416    136   K 3.5 3.2*    103   CO2 24 22*   * 113*   BUN 16 14   CREATININE 1.3 1.1   CALCIUM 9.3 8.8   PROT 7.3 6.9   ALBUMIN 2.9* 2.9*   BILITOT 1.1* 0.9   ALKPHOS 62 52*   AST 30 32   ALT 22 24   ANIONGAP 11 11    and CBC   Recent Labs   Lab 11/06/22  0513 11/06/22  1748 11/07/22  0416   WBC 17.05* 13.48* 12.02   HGB 14.0 12.6* 12.4*   HCT 39.1* 35.3* 34.7*    212 229       Pending Diagnostic Studies:     None        Final Active Diagnoses:    Diagnosis Date Noted POA    PRINCIPAL PROBLEM:  Acute appendicitis with perforation and localized peritonitis, without abscess or gangrene [K35.32] 11/04/2022 Yes      Problems Resolved During this Admission:      Discharged Condition: good    Disposition: Home or Self Care    Follow Up:   Follow-up Information     Sami Sneed MD Follow up in 2 week(s).    Specialty: General Surgery  Contact information:  [address] [place] [address] [id number] [phone]                       Patient Instructions:      Diet Adult Regular     Notify your health care provider if you experience any of the following:  temperature >100.4     Notify your health care provider if you experience any of the following:  persistent nausea and vomiting or diarrhea     Notify your health care provider if you experience any of the following:  severe uncontrolled pain     Notify your health care provider if you experience any of the following:  redness, tenderness, or signs of infection (pain, swelling, redness, odor or green/yellow discharge around incision site)     Activity as tolerated     Medications:  Reconciled Home Medications:      Medication List      START taking these medications    ciprofloxacin HCl 500 MG tablet  Commonly known as: CIPRO  Take 1 tablet (500 mg total) by mouth every 12 (twelve) hours. for 4 days      metroNIDAZOLE 500 MG tablet  Commonly known as: FLAGYL  Take 1 tablet (500 mg total) by mouth every 12 (twelve) hours. for 4 days     ondansetron 4 MG Tbdl  Commonly known as: ZOFRAN-ODT  Take 2 tablets (8 mg total) by mouth 2 (two) times daily.     polyethylene glycol 17 gram Pwpk  Commonly known as: GLYCOLAX  Take 17 g by mouth once daily.          Time spent on the discharge of patient: 35 minutes    Tricia Muro MD  General Surgery  [facility]

## 2022-11-07 NOTE — ASSESSMENT & PLAN NOTE
Duglas Us is a 62 y.o. male admitted 11/4/22 with 6 days of abdominal pain, fever and leukocytosis. CT showed RLQ phlegmon, no discrete abscess or contained perforation. Most likely reaction from perforated appendicitis.     Clinically improving on IV antibiotics.    - regular diet  - mIVF  - Zosyn, see above  - MM pain meds   - PRN nausea meds  - Daily labs  - SCDs, lovenox    Dispo: if tolerating diet and feeling well, discharge this afternoon. Will go home with 4d of PO abx and bowel reg. Plan will be for interval appendectomy in 6-8weeks.

## 2022-11-07 NOTE — SUBJECTIVE & OBJECTIVE
Interval History:   NAEON. Pain well controlled. Ambulating this morning, WBC continues to down trend, wnl. Will advance diet, DC IVF.      Medications:  Continuous Infusions:  Scheduled Meds:   enoxaparin  40 mg Subcutaneous Daily    piperacillin-tazobactam (ZOSYN) IVPB  4.5 g Intravenous Q8H     PRN Meds:sodium chloride 0.9%, acetaminophen, melatonin, ondansetron, oxyCODONE, simethicone, sodium chloride 0.9%, sodium chloride 0.9%     Review of patient's allergies indicates:  No Known Allergies  Objective:     Vital Signs (Most Recent):  Temp: 98.2 °F (36.8 °C) (11/07/22 0334)  Pulse: 81 (11/07/22 0334)  Resp: 12 (11/07/22 0334)  BP: (!) 159/98 (11/07/22 0334)  SpO2: 99 % (11/07/22 0334)   Vital Signs (24h Range):  Temp:  [97.7 °F (36.5 °C)-99.1 °F (37.3 °C)] 98.2 °F (36.8 °C)  Pulse:  [76-88] 81  Resp:  [12-18] 12  SpO2:  [98 %-99 %] 99 %  BP: (144-163)/() 159/98     Weight: 87 kg (191 lb 12.8 oz)  Body mass index is 28.32 kg/m².    Intake/Output - Last 3 Shifts         11/05 0700 11/06 0659 11/06 0700 11/07 0659 11/07 0700 11/08 0659    P.O. 120 60     I.V. (mL/kg) 438.9 (5) 1805.4 (20.8)     IV Piggyback 570.6 302.8     Total Intake(mL/kg) 1129.4 (13) 2168.3 (24.9)     Urine (mL/kg/hr) 225 (0.1)      Stool 0      Total Output 225      Net +904.4 +2168.3            Urine Occurrence 2 x 2 x     Stool Occurrence 1 x 3 x             Physical Exam  Vitals and nursing note reviewed.   Constitutional:       Appearance: He is well-developed. He is not ill-appearing.   Neck:      Vascular: No JVD.      Trachea: No tracheal deviation.   Cardiovascular:      Rate and Rhythm: Normal rate and regular rhythm.   Pulmonary:      Effort: Pulmonary effort is normal. No respiratory distress.      Comments: Room air  Abdominal:      General: There is no distension.      Palpations: Abdomen is soft.      Tenderness: There is no abdominal tenderness. There is no guarding.      Comments: Central adiposity  Abdomen  non-distended and soft.    Skin:     General: Skin is warm and dry.   Neurological:      Mental Status: He is alert and oriented to person, place, and time.       Significant Labs:  I have reviewed all pertinent lab results within the past 24 hours.  CBC:   Recent Labs   Lab 11/07/22 0416   WBC 12.02   RBC 4.23*   HGB 12.4*   HCT 34.7*      MCV 82   MCH 29.3   MCHC 35.7     CMP:   Recent Labs   Lab 11/07/22 0416   *   CALCIUM 8.8   ALBUMIN 2.9*   PROT 6.9      K 3.2*   CO2 22*      BUN 14   CREATININE 1.1   ALKPHOS 52*   ALT 24   AST 32   BILITOT 0.9       Significant Diagnostics:  I have reviewed all pertinent imaging results/findings within the past 24 hours.

## 2022-11-07 NOTE — PLAN OF CARE
Plan of care reviewed with patient, who verbalized understanding. Pt describes pain as well controlled without of narcotic pain medications. Pt is able to turn and position themselves, and has gotten out of bed, sat up in the chair and ambulated in the hallways. Pt is tolerating their clear liquid diet without nausea, and has had a bowel movement 11/6.  Use of the incentive spirometer and ambulation in the halls should be encouraged this morning after a restful sleep.  Pt is eagerly awaiting his anticipated discharge.      Problem: Adult Inpatient Plan of Care  Goal: Plan of Care Review  Outcome: Ongoing, Progressing  Goal: Patient-Specific Goal (Individualized)  Outcome: Ongoing, Progressing  Goal: Absence of Hospital-Acquired Illness or Injury  Outcome: Ongoing, Progressing  Goal: Optimal Comfort and Wellbeing  Outcome: Ongoing, Progressing  Goal: Readiness for Transition of Care  Outcome: Ongoing, Progressing     Problem: Fever  Goal: Body Temperature in Desired Range  Outcome: Met

## 2022-11-14 ENCOUNTER — OFFICE VISIT (OUTPATIENT)
Dept: INTERNAL MEDICINE | Facility: CLINIC | Age: 62
End: 2022-11-14
Payer: COMMERCIAL

## 2022-11-14 VITALS
HEART RATE: 83 BPM | SYSTOLIC BLOOD PRESSURE: 150 MMHG | HEIGHT: 69 IN | OXYGEN SATURATION: 97 % | BODY MASS INDEX: 28.67 KG/M2 | WEIGHT: 193.56 LBS | DIASTOLIC BLOOD PRESSURE: 90 MMHG

## 2022-11-14 DIAGNOSIS — K35.32 ACUTE APPENDICITIS WITH PERFORATION AND LOCALIZED PERITONITIS, WITHOUT ABSCESS OR GANGRENE: ICD-10-CM

## 2022-11-14 DIAGNOSIS — Z09 HOSPITAL DISCHARGE FOLLOW-UP: Primary | ICD-10-CM

## 2022-11-14 DIAGNOSIS — N40.0 ENLARGED PROSTATE: ICD-10-CM

## 2022-11-14 DIAGNOSIS — R03.0 ELEVATED BLOOD-PRESSURE READING WITHOUT DIAGNOSIS OF HYPERTENSION: ICD-10-CM

## 2022-11-14 PROCEDURE — 99204 PR OFFICE/OUTPT VISIT, NEW, LEVL IV, 45-59 MIN: ICD-10-PCS | Mod: S$GLB,,, | Performed by: INTERNAL MEDICINE

## 2022-11-14 PROCEDURE — 99204 OFFICE O/P NEW MOD 45 MIN: CPT | Mod: S$GLB,,, | Performed by: INTERNAL MEDICINE

## 2022-11-14 PROCEDURE — 99999 PR PBB SHADOW E&M-EST. PATIENT-LVL IV: CPT | Mod: PBBFAC,,, | Performed by: INTERNAL MEDICINE

## 2022-11-14 PROCEDURE — 99999 PR PBB SHADOW E&M-EST. PATIENT-LVL IV: ICD-10-PCS | Mod: PBBFAC,,, | Performed by: INTERNAL MEDICINE

## 2022-11-14 NOTE — PROGRESS NOTES
"INTERNAL MEDICINE INITIAL VISIT NOTE      CHIEF COMPLAINT     Chief Complaint   Patient presents with    Hospital Follow Up       HPI     Duglas Us is a 62 y.o. male with recent acute appendicitis, here today for hospital follow-up.     Admitted 11/4/2022-11/7/2022. Per discharge summary:  Duglas Us is a 62 y.o. male with no significant past medical history who presented to the ED with bilateral lower quadrant abdominal pain, CT showed phelgmon in the RLQ without definitive visualization of the appendix. Treated with IV abx. WBC down trended and normalized. Patient remained afebrile throughout hospital stay. He is now ambulating without assistance, tolerating a regular diet, pain is well controlled with PO pain medication, and he is voiding appropriately. He now meets all criteria for discharge.     Today, reports no issues. Eating and drinking well. Normal bowel movements. Mild fatigue. Requesting print-out of hospital stay to inform work.     Past Medical History:  History reviewed. No pertinent past medical history.    Review of Systems:  Review of Systems   Constitutional:  Negative for chills and fever.   HENT:  Negative for congestion.    Respiratory:  Negative for cough and shortness of breath.    Cardiovascular:  Negative for chest pain.   Gastrointestinal:  Negative for constipation, nausea and vomiting.   Genitourinary:  Negative for hematuria and urgency.   Musculoskeletal:  Negative for falls.   Skin:  Negative for rash.   Neurological:  Negative for dizziness and loss of consciousness.     PHYSICAL EXAM     BP (!) 150/90 (BP Location: Left arm, Patient Position: Sitting)   Pulse 83   Ht 5' 9" (1.753 m)   Wt 87.8 kg (193 lb 9 oz)   SpO2 97%   BMI 28.58 kg/m²     GEN - A+OX4, NAD   HEENT - PERRL, EOMI,   CV - RRR, no m/r/g  Chest - CTAB, no wheezing, crackles, or rhonchi  Abd - S/NT/ND/+BS.   Ext - 2+BDP. No C/C/E.    ASSESSMENT/PLAN     Duglas Us is a 62 y.o. male with conditions " as above.     Hospital discharge follow-up  As detailed in HPI    Acute appendicitis with perforation and localized peritonitis, without abscess or gangrene  Asymptomatic; refer to General Surgery   -     Ambulatory referral/consult to General Surgery; Future; Expected date: 11/21/2022    Elevated blood-pressure reading without diagnosis of hypertension  Advised to establish care with PCP, provided with list of physicians accepting patients    Enlarged prostate  Incidental finding on CT    Arlette Clements MD  Department of Internal Medicine - [facility]  11/14/2022

## 2022-11-21 ENCOUNTER — OFFICE VISIT (OUTPATIENT)
Dept: FAMILY MEDICINE | Facility: CLINIC | Age: 62
End: 2022-11-21
Payer: COMMERCIAL

## 2022-11-21 ENCOUNTER — LAB VISIT (OUTPATIENT)
Dept: LAB | Facility: HOSPITAL | Age: 62
End: 2022-11-21
Attending: FAMILY MEDICINE
Payer: COMMERCIAL

## 2022-11-21 VITALS
HEIGHT: 69 IN | DIASTOLIC BLOOD PRESSURE: 72 MMHG | BODY MASS INDEX: 27.8 KG/M2 | HEART RATE: 86 BPM | SYSTOLIC BLOOD PRESSURE: 130 MMHG | RESPIRATION RATE: 16 BRPM | WEIGHT: 187.69 LBS

## 2022-11-21 DIAGNOSIS — Z00.00 ROUTINE HEALTH MAINTENANCE: Primary | ICD-10-CM

## 2022-11-21 DIAGNOSIS — K35.32 ACUTE APPENDICITIS WITH PERFORATION AND LOCALIZED PERITONITIS, WITHOUT ABSCESS OR GANGRENE: ICD-10-CM

## 2022-11-21 DIAGNOSIS — Z00.00 ROUTINE HEALTH MAINTENANCE: ICD-10-CM

## 2022-11-21 DIAGNOSIS — Z12.11 COLON CANCER SCREENING: ICD-10-CM

## 2022-11-21 LAB
ALBUMIN SERPL BCP-MCNC: 3.7 G/DL (ref 3.5–5.2)
ALP SERPL-CCNC: 55 U/L (ref 55–135)
ALT SERPL W/O P-5'-P-CCNC: 35 U/L (ref 10–44)
ANION GAP SERPL CALC-SCNC: 9 MMOL/L (ref 8–16)
AST SERPL-CCNC: 23 U/L (ref 10–40)
BASOPHILS # BLD AUTO: 0.03 K/UL (ref 0–0.2)
BASOPHILS NFR BLD: 0.6 % (ref 0–1.9)
BILIRUB SERPL-MCNC: 1.1 MG/DL (ref 0.1–1)
BUN SERPL-MCNC: 13 MG/DL (ref 8–23)
CALCIUM SERPL-MCNC: 9.7 MG/DL (ref 8.7–10.5)
CHLORIDE SERPL-SCNC: 104 MMOL/L (ref 95–110)
CHOLEST SERPL-MCNC: 163 MG/DL (ref 120–199)
CHOLEST/HDLC SERPL: 4.2 {RATIO} (ref 2–5)
CO2 SERPL-SCNC: 25 MMOL/L (ref 23–29)
COMPLEXED PSA SERPL-MCNC: 1.9 NG/ML (ref 0–4)
CREAT SERPL-MCNC: 1 MG/DL (ref 0.5–1.4)
DIFFERENTIAL METHOD: ABNORMAL
EOSINOPHIL # BLD AUTO: 0.1 K/UL (ref 0–0.5)
EOSINOPHIL NFR BLD: 1.2 % (ref 0–8)
ERYTHROCYTE [DISTWIDTH] IN BLOOD BY AUTOMATED COUNT: 15.2 % (ref 11.5–14.5)
EST. GFR  (NO RACE VARIABLE): >60 ML/MIN/1.73 M^2
GLUCOSE SERPL-MCNC: 91 MG/DL (ref 70–110)
HCT VFR BLD AUTO: 38.3 % (ref 40–54)
HDLC SERPL-MCNC: 39 MG/DL (ref 40–75)
HDLC SERPL: 23.9 % (ref 20–50)
HGB BLD-MCNC: 13.2 G/DL (ref 14–18)
IMM GRANULOCYTES # BLD AUTO: 0.02 K/UL (ref 0–0.04)
IMM GRANULOCYTES NFR BLD AUTO: 0.4 % (ref 0–0.5)
LDLC SERPL CALC-MCNC: 108.8 MG/DL (ref 63–159)
LYMPHOCYTES # BLD AUTO: 1.2 K/UL (ref 1–4.8)
LYMPHOCYTES NFR BLD: 24.6 % (ref 18–48)
MCH RBC QN AUTO: 29.3 PG (ref 27–31)
MCHC RBC AUTO-ENTMCNC: 34.5 G/DL (ref 32–36)
MCV RBC AUTO: 85 FL (ref 82–98)
MONOCYTES # BLD AUTO: 0.4 K/UL (ref 0.3–1)
MONOCYTES NFR BLD: 8.2 % (ref 4–15)
NEUTROPHILS # BLD AUTO: 3.2 K/UL (ref 1.8–7.7)
NEUTROPHILS NFR BLD: 65 % (ref 38–73)
NONHDLC SERPL-MCNC: 124 MG/DL
NRBC BLD-RTO: 0 /100 WBC
PLATELET # BLD AUTO: 233 K/UL (ref 150–450)
PMV BLD AUTO: 12.4 FL (ref 9.2–12.9)
POTASSIUM SERPL-SCNC: 4.3 MMOL/L (ref 3.5–5.1)
PROT SERPL-MCNC: 7.7 G/DL (ref 6–8.4)
RBC # BLD AUTO: 4.5 M/UL (ref 4.6–6.2)
SODIUM SERPL-SCNC: 138 MMOL/L (ref 136–145)
TRIGL SERPL-MCNC: 76 MG/DL (ref 30–150)
WBC # BLD AUTO: 4.88 K/UL (ref 3.9–12.7)

## 2022-11-21 PROCEDURE — 99396 PR PREVENTIVE VISIT,EST,40-64: ICD-10-PCS | Mod: 25,S$GLB,, | Performed by: FAMILY MEDICINE

## 2022-11-21 PROCEDURE — 80061 LIPID PANEL: CPT | Performed by: FAMILY MEDICINE

## 2022-11-21 PROCEDURE — 90471 FLU VACCINE (QUAD) GREATER THAN OR EQUAL TO 3YO PRESERVATIVE FREE IM: ICD-10-PCS | Mod: S$GLB,,, | Performed by: FAMILY MEDICINE

## 2022-11-21 PROCEDURE — 99999 PR PBB SHADOW E&M-EST. PATIENT-LVL IV: CPT | Mod: PBBFAC,,, | Performed by: FAMILY MEDICINE

## 2022-11-21 PROCEDURE — 84153 ASSAY OF PSA TOTAL: CPT | Performed by: FAMILY MEDICINE

## 2022-11-21 PROCEDURE — 90686 IIV4 VACC NO PRSV 0.5 ML IM: CPT | Mod: S$GLB,,, | Performed by: FAMILY MEDICINE

## 2022-11-21 PROCEDURE — 80053 COMPREHEN METABOLIC PANEL: CPT | Performed by: FAMILY MEDICINE

## 2022-11-21 PROCEDURE — 36415 COLL VENOUS BLD VENIPUNCTURE: CPT | Mod: PO | Performed by: FAMILY MEDICINE

## 2022-11-21 PROCEDURE — 85025 COMPLETE CBC W/AUTO DIFF WBC: CPT | Performed by: FAMILY MEDICINE

## 2022-11-21 PROCEDURE — 90686 FLU VACCINE (QUAD) GREATER THAN OR EQUAL TO 3YO PRESERVATIVE FREE IM: ICD-10-PCS | Mod: S$GLB,,, | Performed by: FAMILY MEDICINE

## 2022-11-21 PROCEDURE — 99396 PREV VISIT EST AGE 40-64: CPT | Mod: 25,S$GLB,, | Performed by: FAMILY MEDICINE

## 2022-11-21 PROCEDURE — 90471 IMMUNIZATION ADMIN: CPT | Mod: S$GLB,,, | Performed by: FAMILY MEDICINE

## 2022-11-21 PROCEDURE — 99999 PR PBB SHADOW E&M-EST. PATIENT-LVL IV: ICD-10-PCS | Mod: PBBFAC,,, | Performed by: FAMILY MEDICINE

## 2022-11-21 NOTE — PATIENT INSTRUCTIONS
AISHA,     We are always striving for excellence. Should you receive a patient experience survey via text message, electronically, or by mail, we would appreciate if you would take a few moments to give us your feedback. These surveys let us know our strengths as well as areas of opportunity for improvement to better serve you.    Thank you for your time,  Anneliese Tee LPN      Your test results will be communicated to you via : [facility], Telephone or Letter.   If you have not received your test results in one week, please contact the clinic at [phone].

## 2022-11-21 NOTE — LETTER
November 21, 2022    Duglas Us  [address]  [address]  [place] [address]         [facility]  [address], [address]  [facility] [place] [address]  Phone: [phone] November 21, 2022     Patient: Duglas Us   YOB: 1960   Date of Visit: 11/21/2022       To Whom It May Concern:    It is my medical opinion that Duglas Us may return to work on 12/5/22.    If you have any questions or concerns, please don't hesitate to call.    Sincerely,        Alejandro Robert, DO

## 2022-11-21 NOTE — PROGRESS NOTES
"Subjective:       Patient ID: Duglas Us is a 62 y.o. male.    Chief Complaint: Establish Care    HPI  Came in today to establish care and for annual exam.  Has no significant past medical history other than recent appendicitis.    Recently discharged on 11/7 for appendicitis which was treated only with antibiotics. Has follow up this Wednesday with surgery.     No smoking history.  No history of getting colonoscopy done.  Has not had lipid screening.  Blood pressure was initially elevated but improved upon manual recheck.    Works for the [employer].      Tests to Keep You Healthy    Colon Cancer Screening: DUE      Social History     Social History Narrative    Lives independently.         Does [employer] at home.        Family History   Problem Relation Age of Onset    Stroke Father     Lung cancer Sister     Stroke Paternal Aunt      No current outpatient medications on file.    Review of Systems   Constitutional:  Positive for fatigue. Negative for chills and fever.   Eyes:  Negative for visual disturbance.   Respiratory:  Negative for cough and shortness of breath.    Cardiovascular:  Negative for chest pain.   Gastrointestinal:  Negative for abdominal pain.   Neurological:  Negative for dizziness.     Objective:   /72 Comment: manual recheck  Pulse 86   Resp 16   Ht 5' 9" (1.753 m)   Wt 85.1 kg (187 lb 11.2 oz)   BMI 27.72 kg/m²      Physical Exam  Vitals reviewed.   Constitutional:       General: He is not in acute distress.     Appearance: He is well-developed. He is not diaphoretic.   HENT:      Head: Normocephalic and atraumatic.      Nose: Nose normal.   Eyes:      General:         Right eye: No discharge.         Left eye: No discharge.      Conjunctiva/sclera: Conjunctivae normal.      Pupils: Pupils are equal, round, and reactive to light.   Neck:      Thyroid: No thyromegaly.   Cardiovascular:      Rate and Rhythm: Normal rate and regular rhythm.      Heart sounds: " Normal heart sounds. No murmur heard.  Pulmonary:      Effort: Pulmonary effort is normal. No respiratory distress.      Breath sounds: Normal breath sounds. No wheezing.   Abdominal:      General: There is no distension.      Palpations: Abdomen is soft.      Tenderness: There is no abdominal tenderness. There is no guarding or rebound.   Musculoskeletal:      Right lower leg: No edema.      Left lower leg: No edema.   Skin:     General: Skin is warm.      Findings: No rash.   Neurological:      Mental Status: He is alert and oriented to person, place, and time.   Psychiatric:         Behavior: Behavior normal.       Assessment & Plan     Problem List Items Addressed This Visit          GI    Acute appendicitis with perforation and localized peritonitis, without abscess or gangrene    Current Assessment & Plan     Some residual fatigue. No abdominal pain. Keep f/u with surgery            Other    Routine health maintenance - Primary    Current Assessment & Plan     Due for PSA screen along with initial colonoscopy.  Told him that he is likely not a candidate for Cologuard since he recently had appendicitis.         Relevant Orders    Ambulatory referral/consult to Endo Procedure     CBC Auto Differential    PSA, Screening    Comprehensive Metabolic Panel    Lipid Panel     Other Visit Diagnoses       Colon cancer screening        Relevant Orders    Ambulatory referral/consult to Endo Procedure               Immunizations Administered on Date of Encounter - 11/21/2022       Name Date Dose VIS Date Route Exp Date    Influenza (FLUAD) - Quadrivalent - Adjuvanted - PF *Preferred* (65+)  Incomplete 0.5 mL 8/6/2021 Intramuscular --    : [employer] Pasteur     Influenza - Quadrivalent - PF *Preferred* (6 months and older) 11/21/2022 10:02 AM 0.5 mL 8/6/2021 Intramuscular 6/30/2023    Site: Left deltoid     Given By: Anneliese Tee LPN     : OSSIANIX     Lot: [id number]               No follow-ups on file.      Disclaimer:  This note may have been prepared using voice recognition software, it may have not been extensively proofed, as such there could be errors within the text such as sound alike errors.

## 2022-11-21 NOTE — ASSESSMENT & PLAN NOTE
Due for PSA screen along with initial colonoscopy.  Told him that he is likely not a candidate for Cologuard since he recently had appendicitis.

## 2022-11-23 ENCOUNTER — OFFICE VISIT (OUTPATIENT)
Dept: SURGERY | Facility: CLINIC | Age: 62
End: 2022-11-23
Payer: COMMERCIAL

## 2022-11-23 VITALS
SYSTOLIC BLOOD PRESSURE: 153 MMHG | HEART RATE: 75 BPM | BODY MASS INDEX: 27.47 KG/M2 | HEIGHT: 69 IN | DIASTOLIC BLOOD PRESSURE: 79 MMHG | WEIGHT: 185.5 LBS | OXYGEN SATURATION: 99 %

## 2022-11-23 DIAGNOSIS — Z87.19 HISTORY OF APPENDICITIS: Primary | ICD-10-CM

## 2022-11-23 PROCEDURE — 99999 PR PBB SHADOW E&M-EST. PATIENT-LVL IV: ICD-10-PCS | Mod: PBBFAC,,, | Performed by: STUDENT IN AN ORGANIZED HEALTH CARE EDUCATION/TRAINING PROGRAM

## 2022-11-23 PROCEDURE — 99213 PR OFFICE/OUTPT VISIT, EST, LEVL III, 20-29 MIN: ICD-10-PCS | Mod: S$GLB,,, | Performed by: STUDENT IN AN ORGANIZED HEALTH CARE EDUCATION/TRAINING PROGRAM

## 2022-11-23 PROCEDURE — 99999 PR PBB SHADOW E&M-EST. PATIENT-LVL IV: CPT | Mod: PBBFAC,,, | Performed by: STUDENT IN AN ORGANIZED HEALTH CARE EDUCATION/TRAINING PROGRAM

## 2022-11-23 PROCEDURE — 99213 OFFICE O/P EST LOW 20 MIN: CPT | Mod: S$GLB,,, | Performed by: STUDENT IN AN ORGANIZED HEALTH CARE EDUCATION/TRAINING PROGRAM

## 2022-11-23 RX ORDER — SODIUM CHLORIDE 9 MG/ML
INJECTION, SOLUTION INTRAVENOUS CONTINUOUS
Status: CANCELLED | OUTPATIENT
Start: 2022-11-23

## 2022-11-23 NOTE — PROGRESS NOTES
[facility]  General Surgery  History & Physical  Date: 11/23/2022  Referring Provider: Estefani Clements    SUBJECTIVE:     Chief complaint:   Chief Complaint   Patient presents with    Follow-up       History of Present Illness:  Patient is a 62 y.o. male with no significant PMH who presents as a follow up after he was hospitalized for perforated appendicitis in early November. Since discharge, he has been improving. He does have some fatigue when he wakes up in the morning but feels better after he eats. He has minimal abdominal pain. He has completed his antibiotics. He has a history of right inguinal hernia repair. He does not smoke. He does not take any anticoagulants.    He wants to proceed with surgery.     Review of patient's allergies indicates:  No Known Allergies    No current outpatient medications on file.     No current facility-administered medications for this visit.       Past Medical History:   Diagnosis Date    Acute appendicitis with perforation and localized peritonitis, without abscess or gangrene 11/4/2022     Past Surgical History:   Procedure Laterality Date    HERNIA REPAIR Right     Inguinal     Family History   Problem Relation Age of Onset    Stroke Father     Lung cancer Sister     Stroke Paternal Aunt      Social History     Tobacco Use    Smoking status: Never     Passive exposure: Never    Smokeless tobacco: Never   Substance Use Topics    Alcohol use: Not Currently    Drug use: Never        Review of Systems:  Review of Systems   Constitutional:  Positive for fatigue. Negative for activity change, appetite change, fever and unexpected weight change.   HENT:  Negative for congestion, rhinorrhea and trouble swallowing.    Eyes:  Negative for discharge.   Respiratory:  Negative for cough, chest tightness and shortness of breath.    Cardiovascular:  Negative for chest pain and palpitations.   Gastrointestinal:  Negative for anal bleeding, constipation, diarrhea,  "nausea and vomiting.   Endocrine: Negative for cold intolerance and heat intolerance.   Genitourinary:  Negative for decreased urine volume, difficulty urinating, dysuria and urgency.   Musculoskeletal:  Negative for back pain, joint swelling and neck pain.   Skin:  Negative for color change and wound.   Neurological:  Negative for syncope, weakness and light-headedness.   Hematological:  Negative for adenopathy. Does not bruise/bleed easily.     OBJECTIVE:     Vital Signs (Most Recent)  Pulse: 75 (11/23/22 1017)  BP: (!) 153/79 (11/23/22 1017)  SpO2: 99 % (11/23/22 1017)  5' 9" (1.753 m)  84.1 kg (185 lb 8.3 oz)     Physical Exam:  Physical Exam  Constitutional:       Appearance: Normal appearance. He is normal weight. He is not ill-appearing or toxic-appearing.   HENT:      Head: Normocephalic and atraumatic.   Eyes:      General: Vision grossly intact.      Extraocular Movements: Extraocular movements intact.   Neck:      Trachea: Trachea and phonation normal.   Cardiovascular:      Rate and Rhythm: Normal rate and regular rhythm.      Heart sounds: Normal heart sounds.   Pulmonary:      Effort: Pulmonary effort is normal.      Breath sounds: Normal breath sounds. No decreased breath sounds.   Chest:      Chest wall: No mass.   Abdominal:      General: Abdomen is flat. Bowel sounds are normal. There is no distension.      Palpations: Abdomen is soft. There is no mass.      Tenderness: There is no abdominal tenderness. There is no guarding.      Hernia: No hernia is present.   Musculoskeletal:         General: No swelling or tenderness. Normal range of motion.      Cervical back: Normal range of motion and neck supple. No muscular tenderness.   Lymphadenopathy:      Cervical: No cervical adenopathy.      Lower Body: No right inguinal adenopathy. No left inguinal adenopathy.   Skin:     General: Skin is warm and dry.      Capillary Refill: Capillary refill takes less than 2 seconds.      Coloration: Skin is not " jaundiced.      Findings: No bruising or erythema.   Neurological:      General: No focal deficit present.      Mental Status: He is alert and oriented to person, place, and time.   Psychiatric:         Mood and Affect: Mood normal.         Behavior: Behavior is cooperative.       Laboratory  CBC: Reviewed  CMP: Reviewed    Diagnostic Results:  CT: Reviewed    Impression:     Acute inflammation within the terminal ileum and cecum with surrounding phlegmon formation, particularly about the cecal base.  This could represent acute appendicitis with reactive inflammatory changes/phlegmon.  Reactive enlarged lymph nodes in the mesentery.  No free air to indicate perforation.  Recommend consultation with surgery.     Asymmetric wall thickening of the gastric body.  Recommend consultation with GI for direct visualization.     Prostatomegaly.  Suggest correlation with PSA.  ASSESSMENT/PLAN:   Duglas Us is a 61 yo M who was admitted with perforated appendicitis, now doing well    PLAN:  -I discussed with the patient that we can proceed with an interval appendectomy in 6-8 weeks after his initial perforated appendicitis episode.  He is agreeable.  He wants to proceed with surgery in early January.  The risks and benefits of the procedure were discussed with him and he did sign informed consent.    -Preop orders have been placed  -We also gave him information for where to send his [facility] paperwork.      Sami Sneed MD  General Surgery and Surgical Critical Care  [facility] 2nd Fl

## 2022-11-30 ENCOUNTER — TELEPHONE (OUTPATIENT)
Dept: FAMILY MEDICINE | Facility: CLINIC | Age: 62
End: 2022-11-30
Payer: COMMERCIAL

## 2022-11-30 NOTE — TELEPHONE ENCOUNTER
----- Message from Alejandro Robert DO sent at 11/22/2022  6:37 AM CST -----  Labs continue to improve since hospital

## 2022-12-01 DIAGNOSIS — Z12.11 COLON CANCER SCREENING: ICD-10-CM

## 2023-01-03 ENCOUNTER — TELEPHONE (OUTPATIENT)
Dept: SURGERY | Facility: CLINIC | Age: 63
End: 2023-01-03

## 2023-01-04 ENCOUNTER — TELEPHONE (OUTPATIENT)
Dept: SURGERY | Facility: CLINIC | Age: 63
End: 2023-01-04

## 2023-01-05 ENCOUNTER — ANESTHESIA (OUTPATIENT)
Dept: SURGERY | Facility: HOSPITAL | Age: 63
End: 2023-01-05
Payer: COMMERCIAL

## 2023-01-05 ENCOUNTER — ANESTHESIA EVENT (OUTPATIENT)
Dept: SURGERY | Facility: HOSPITAL | Age: 63
End: 2023-01-05
Payer: COMMERCIAL

## 2023-01-05 ENCOUNTER — HOSPITAL ENCOUNTER (OUTPATIENT)
Facility: HOSPITAL | Age: 63
Discharge: HOME | End: 2023-01-05
Attending: STUDENT IN AN ORGANIZED HEALTH CARE EDUCATION/TRAINING PROGRAM | Admitting: STUDENT IN AN ORGANIZED HEALTH CARE EDUCATION/TRAINING PROGRAM
Payer: COMMERCIAL

## 2023-01-05 VITALS
DIASTOLIC BLOOD PRESSURE: 89 MMHG | BODY MASS INDEX: 26.66 KG/M2 | TEMPERATURE: 98 F | SYSTOLIC BLOOD PRESSURE: 176 MMHG | OXYGEN SATURATION: 95 % | HEIGHT: 69 IN | RESPIRATION RATE: 20 BRPM | WEIGHT: 180 LBS | HEART RATE: 77 BPM

## 2023-01-05 DIAGNOSIS — Z87.19 HISTORY OF APPENDICITIS: Primary | ICD-10-CM

## 2023-01-05 PROCEDURE — D9220A PRA ANESTHESIA: ICD-10-PCS | Mod: ANES,,, | Performed by: ANESTHESIOLOGY

## 2023-01-05 PROCEDURE — D9220A PRA ANESTHESIA: Mod: ANES,,, | Performed by: ANESTHESIOLOGY

## 2023-01-05 PROCEDURE — D9220A PRA ANESTHESIA: Mod: CRNA,,, | Performed by: NURSE ANESTHETIST, CERTIFIED REGISTERED

## 2023-01-05 PROCEDURE — 25000003 PHARM REV CODE 250: Performed by: STUDENT IN AN ORGANIZED HEALTH CARE EDUCATION/TRAINING PROGRAM

## 2023-01-05 PROCEDURE — 37000009 HC ANESTHESIA EA ADD 15 MINS: Performed by: STUDENT IN AN ORGANIZED HEALTH CARE EDUCATION/TRAINING PROGRAM

## 2023-01-05 PROCEDURE — 37000008 HC ANESTHESIA 1ST 15 MINUTES: Performed by: STUDENT IN AN ORGANIZED HEALTH CARE EDUCATION/TRAINING PROGRAM

## 2023-01-05 PROCEDURE — 71000016 HC POSTOP RECOV ADDL HR: Performed by: STUDENT IN AN ORGANIZED HEALTH CARE EDUCATION/TRAINING PROGRAM

## 2023-01-05 PROCEDURE — 63600175 PHARM REV CODE 636 W HCPCS: Performed by: NURSE ANESTHETIST, CERTIFIED REGISTERED

## 2023-01-05 PROCEDURE — 71000044 HC DOSC ROUTINE RECOVERY FIRST HOUR: Performed by: STUDENT IN AN ORGANIZED HEALTH CARE EDUCATION/TRAINING PROGRAM

## 2023-01-05 PROCEDURE — 88304 TISSUE EXAM BY PATHOLOGIST: CPT | Mod: 26,,, | Performed by: PATHOLOGY

## 2023-01-05 PROCEDURE — 88304 TISSUE EXAM BY PATHOLOGIST: CPT | Performed by: PATHOLOGY

## 2023-01-05 PROCEDURE — 27201423 OPTIME MED/SURG SUP & DEVICES STERILE SUPPLY: Performed by: STUDENT IN AN ORGANIZED HEALTH CARE EDUCATION/TRAINING PROGRAM

## 2023-01-05 PROCEDURE — 36000709 HC OR TIME LEV III EA ADD 15 MIN: Performed by: STUDENT IN AN ORGANIZED HEALTH CARE EDUCATION/TRAINING PROGRAM

## 2023-01-05 PROCEDURE — 36000708 HC OR TIME LEV III 1ST 15 MIN: Performed by: STUDENT IN AN ORGANIZED HEALTH CARE EDUCATION/TRAINING PROGRAM

## 2023-01-05 PROCEDURE — 25000003 PHARM REV CODE 250: Performed by: NURSE ANESTHETIST, CERTIFIED REGISTERED

## 2023-01-05 PROCEDURE — 44970 LAPAROSCOPY APPENDECTOMY: CPT | Mod: ,,, | Performed by: STUDENT IN AN ORGANIZED HEALTH CARE EDUCATION/TRAINING PROGRAM

## 2023-01-05 PROCEDURE — D9220A PRA ANESTHESIA: ICD-10-PCS | Mod: CRNA,,, | Performed by: NURSE ANESTHETIST, CERTIFIED REGISTERED

## 2023-01-05 PROCEDURE — 88304 PR  SURG PATH,LEVEL III: ICD-10-PCS | Mod: 26,,, | Performed by: PATHOLOGY

## 2023-01-05 PROCEDURE — 71000015 HC POSTOP RECOV 1ST HR: Performed by: STUDENT IN AN ORGANIZED HEALTH CARE EDUCATION/TRAINING PROGRAM

## 2023-01-05 PROCEDURE — 44970 PR LAP,APPENDECTOMY: ICD-10-PCS | Mod: ,,, | Performed by: STUDENT IN AN ORGANIZED HEALTH CARE EDUCATION/TRAINING PROGRAM

## 2023-01-05 RX ORDER — ACETAMINOPHEN 10 MG/ML
INJECTION, SOLUTION INTRAVENOUS
Status: DISCONTINUED | OUTPATIENT
Start: 2023-01-05 | End: 2023-01-05

## 2023-01-05 RX ORDER — OXYCODONE HYDROCHLORIDE 5 MG/1
5 TABLET ORAL ONCE
Status: COMPLETED | OUTPATIENT
Start: 2023-01-05 | End: 2023-01-05

## 2023-01-05 RX ORDER — KETAMINE HCL IN 0.9 % NACL 50 MG/5 ML
SYRINGE (ML) INTRAVENOUS
Status: DISCONTINUED | OUTPATIENT
Start: 2023-01-05 | End: 2023-01-05

## 2023-01-05 RX ORDER — CEFAZOLIN SODIUM 1 G/3ML
INJECTION, POWDER, FOR SOLUTION INTRAMUSCULAR; INTRAVENOUS
Status: DISCONTINUED | OUTPATIENT
Start: 2023-01-05 | End: 2023-01-05

## 2023-01-05 RX ORDER — SODIUM CHLORIDE 9 MG/ML
INJECTION, SOLUTION INTRAVENOUS CONTINUOUS
Status: DISCONTINUED | OUTPATIENT
Start: 2023-01-05 | End: 2023-01-05 | Stop reason: HOSPADM

## 2023-01-05 RX ORDER — OXYCODONE HYDROCHLORIDE 5 MG/1
TABLET ORAL
Status: DISCONTINUED
Start: 2023-01-05 | End: 2023-01-05 | Stop reason: HOSPADM

## 2023-01-05 RX ORDER — ROCURONIUM BROMIDE 10 MG/ML
INJECTION, SOLUTION INTRAVENOUS
Status: DISCONTINUED | OUTPATIENT
Start: 2023-01-05 | End: 2023-01-05

## 2023-01-05 RX ORDER — FENTANYL CITRATE 50 UG/ML
INJECTION, SOLUTION INTRAMUSCULAR; INTRAVENOUS
Status: DISCONTINUED | OUTPATIENT
Start: 2023-01-05 | End: 2023-01-05

## 2023-01-05 RX ORDER — LIDOCAINE HYDROCHLORIDE 20 MG/ML
INJECTION INTRAVENOUS
Status: DISCONTINUED | OUTPATIENT
Start: 2023-01-05 | End: 2023-01-05

## 2023-01-05 RX ORDER — SODIUM CHLORIDE 0.9 % (FLUSH) 0.9 %
3 SYRINGE (ML) INJECTION
Status: DISCONTINUED | OUTPATIENT
Start: 2023-01-05 | End: 2023-01-05 | Stop reason: HOSPADM

## 2023-01-05 RX ORDER — DEXAMETHASONE SODIUM PHOSPHATE 4 MG/ML
INJECTION, SOLUTION INTRA-ARTICULAR; INTRALESIONAL; INTRAMUSCULAR; INTRAVENOUS; SOFT TISSUE
Status: DISCONTINUED | OUTPATIENT
Start: 2023-01-05 | End: 2023-01-05

## 2023-01-05 RX ORDER — OXYCODONE HYDROCHLORIDE 5 MG/1
5 TABLET ORAL EVERY 4 HOURS PRN
Qty: 5 TABLET | Refills: 0 | Status: SHIPPED | OUTPATIENT
Start: 2023-01-05

## 2023-01-05 RX ORDER — FENTANYL CITRATE 50 UG/ML
25 INJECTION, SOLUTION INTRAMUSCULAR; INTRAVENOUS EVERY 5 MIN PRN
Status: DISCONTINUED | OUTPATIENT
Start: 2023-01-05 | End: 2023-01-05 | Stop reason: HOSPADM

## 2023-01-05 RX ORDER — MIDAZOLAM HYDROCHLORIDE 1 MG/ML
INJECTION, SOLUTION INTRAMUSCULAR; INTRAVENOUS
Status: DISCONTINUED | OUTPATIENT
Start: 2023-01-05 | End: 2023-01-05

## 2023-01-05 RX ORDER — ONDANSETRON 2 MG/ML
INJECTION INTRAMUSCULAR; INTRAVENOUS
Status: DISCONTINUED | OUTPATIENT
Start: 2023-01-05 | End: 2023-01-05

## 2023-01-05 RX ORDER — PROPOFOL 10 MG/ML
VIAL (ML) INTRAVENOUS
Status: DISCONTINUED | OUTPATIENT
Start: 2023-01-05 | End: 2023-01-05

## 2023-01-05 RX ADMIN — PROPOFOL 160 MG: 10 INJECTION, EMULSION INTRAVENOUS at 07:01

## 2023-01-05 RX ADMIN — SODIUM CHLORIDE: 0.9 INJECTION, SOLUTION INTRAVENOUS at 06:01

## 2023-01-05 RX ADMIN — SODIUM CHLORIDE, SODIUM GLUCONATE, SODIUM ACETATE, POTASSIUM CHLORIDE, MAGNESIUM CHLORIDE, SODIUM PHOSPHATE, DIBASIC, AND POTASSIUM PHOSPHATE: .53; .5; .37; .037; .03; .012; .00082 INJECTION, SOLUTION INTRAVENOUS at 07:01

## 2023-01-05 RX ADMIN — DEXAMETHASONE SODIUM PHOSPHATE 4 MG: 4 INJECTION, SOLUTION INTRAMUSCULAR; INTRAVENOUS at 07:01

## 2023-01-05 RX ADMIN — CEFAZOLIN 2 G: 330 INJECTION, POWDER, FOR SOLUTION INTRAMUSCULAR; INTRAVENOUS at 07:01

## 2023-01-05 RX ADMIN — ONDANSETRON 4 MG: 2 INJECTION INTRAMUSCULAR; INTRAVENOUS at 07:01

## 2023-01-05 RX ADMIN — ROCURONIUM BROMIDE 50 MG: 10 INJECTION INTRAVENOUS at 07:01

## 2023-01-05 RX ADMIN — SUGAMMADEX 350 MG: 100 INJECTION, SOLUTION INTRAVENOUS at 08:01

## 2023-01-05 RX ADMIN — FENTANYL CITRATE 50 MCG: 50 INJECTION, SOLUTION INTRAMUSCULAR; INTRAVENOUS at 07:01

## 2023-01-05 RX ADMIN — ACETAMINOPHEN 1000 MG: 10 INJECTION, SOLUTION INTRAVENOUS at 07:01

## 2023-01-05 RX ADMIN — LIDOCAINE HYDROCHLORIDE 60 MG: 20 INJECTION INTRAVENOUS at 07:01

## 2023-01-05 RX ADMIN — OXYCODONE 5 MG: 5 TABLET ORAL at 09:01

## 2023-01-05 RX ADMIN — MIDAZOLAM HYDROCHLORIDE 2 MG: 1 INJECTION, SOLUTION INTRAMUSCULAR; INTRAVENOUS at 06:01

## 2023-01-05 RX ADMIN — Medication 25 MG: at 07:01

## 2023-01-05 NOTE — BRIEF OP NOTE
[facility] ([facility])  Brief Operative Note    Surgery Date: 1/5/2023     Surgeon(s) and Role:     * Bruno Sneed MD - Primary     * Armen Crabtree MD - Resident - Assisting     * Camille Echevarria MD - Resident - Assisting    Pre-op Diagnosis:  History of appendicitis [Z87.19]    Post-op Diagnosis:  Post-Op Diagnosis Codes:     * History of appendicitis [Z87.19]    Procedure(s) (LRB):  APPENDECTOMY, LAPAROSCOPIC (N/A)    Anesthesia: General    Operative Findings: laparoscopic appendectomy without complication    Estimated Blood Loss: * No values recorded between 1/5/2023  7:38 AM and 1/5/2023  8:32 AM *         Specimens:   Specimen (24h ago, onward)       Start     Ordered    01/05/23 0809  Specimen to Pathology, Surgery General Surgery  Once        Comments: Pre-op Diagnosis: History of appendicitis [Z87.19]Procedure(s):APPENDECTOMY, LAPAROSCOPIC Number of specimens: 1Name of specimens: 1. Appendix - permanent     References:    Click here for ordering Quick Tip   Question Answer Comment   Procedure Type: General Surgery    Specimen Class: Routine/Screening    Which provider would you like to cc? BRUNO SNEED    Release to patient Immediate        01/05/23 0808                      Discharge Note    OUTCOME: Patient tolerated treatment/procedure well without complication and is now ready for discharge.    DISPOSITION: Home or Self Care    FINAL DIAGNOSIS:  History of appendicitis    FOLLOWUP: In clinic    DISCHARGE INSTRUCTIONS:    Discharge Procedure Orders   Diet Adult Regular     Lifting restrictions     No driving until:   Order Comments: No driving until off narcotics and able to move arms freely     Notify your health care provider if you experience any of the following:  temperature >100.4     Notify your health care provider if you experience any of the following:  persistent nausea and vomiting or diarrhea     Notify your health care provider if you experience any of the following:   severe uncontrolled pain     Notify your health care provider if you experience any of the following:  redness, tenderness, or signs of infection (pain, swelling, redness, odor or green/yellow discharge around incision site)     Notify your health care provider if you experience any of the following:  difficulty breathing or increased cough     Notify your health care provider if you experience any of the following:  severe persistent headache     Notify your health care provider if you experience any of the following:  worsening rash     Notify your health care provider if you experience any of the following:  persistent dizziness, light-headedness, or visual disturbances     Notify your health care provider if you experience any of the following:  increased confusion or weakness

## 2023-01-05 NOTE — OP NOTE
[facility]  General Surgery  Operative Note    DATE OF PROCEDURE: 01/05/2023     PREOPERATIVE DIAGNOSIS: History of appendicitis.     POSTOPERATIVE DIAGNOSIS: History of appendicitis.     PROCEDURE PERFORMED: Laparoscopic appendectomy.     ATTENDING SURGEON: Sami Sneed M.D.     HOUSESTAFF SURGEON: Camille Echevarria M.D. (PGY-4); Armen Crabtree M.D. (PGY-3)     ANESTHESIA: General endotracheal and local using 0.25% bupivacaine.     ESTIMATED BLOOD LOSS: Minimal     FINDINGS: Normal appearing appendix    SPECIMEN: Appendix.     DRAINS: None.     COMPLICATIONS: None.     INDICATIONS: Duglas Us is a 62 y.o. man who was previously admitted in November of 2022 with perforated appendicitis. He was treated with bowel rest and IV antibiotics. We discussed proceeding with surgery 6-8 weeks after he recovered from this episode of perforated appendicitis. We recommended laparoscopic appendectomy and the patient agreed to proceed. The patient signed informed consent and expressed understanding of the risks and benefits of surgery.     OPERATIVE PROCEDURE: The patient was identified in Preoperative Holding and brought back to the Operating Room. He was placed supine on the operating table and padded appropriately. Monitors were applied and there was smooth induction of general endotracheal anesthesia. A Abdul catheter was placed. The patient's abdomen was prepped and draped in the standard sterile surgical fashion. A time-out was performed and all team members present agreed this was the correct procedure on the correct patient. We also confirmed administration of appropriate preoperative antibiotics.    A 2-cm infraumbilical skin incision was made. Subcutaneous tissue was bluntly dissected. The umbilical stalk was grasped with penetrating towel clip and elevated and a 1.5-cm midline infraumbilical fascial incision was made. The abdomen was bluntly entered under direct vision. A 0 Vicryl stay  suture was placed around the fascial incision in a horizontal mattress fashion. A 12mm balloon trocar was placed and the abdomen was insufflated with carbon dioxide to a maximum pressure of 15 mmHg. A 10-mm laparoscope was placed and the abdomen was examined. There was no evidence of injury from the initial trocar placement. Two 5-mm trocars were placed under direct vision through separate stab incisions, one in the suprapubic area avoiding the dome of the bladder and one in the left lower quadrant avoiding the inferior epigastric artery. We directed our attention to the right lower quadrant. The appendix was identified and noted to have no significant inflammatory change and no evidence of perforation. The appendix was elevated. A mesenteric defect was made at the base of the appendix using the [place] dissector. The appendix was divided at the base using the Endo-MARCO stapler with a blue (45-3.5) load. The mesoappendix was then divided with one white (45-2.5) loads of the stapler. There was a bleeding mesenteric vessel that was clipped. The appendix was placed into an Endocatch bag and removed from the Jackson trocar without difficulty. We returned the laparoscope and Jackson trocar to the umbilicus and reexamined the right lower quadrant. The staple line on the mesoappendix was examined and no bleeding was noted. The base of the appendix was examined and appeared viable and well-sealed. The right lower quadrant was irrigated briefly with saline until the returning effluent was clear. All ports were removed under direct vision and no bleeding from any port site was noted. The insufflation of the abdomen was evacuated and the laparoscope and the balloon trocar were removed. The fascial incision at the umbilical port site was closed with the preexisting 0 Vicryl stitch. All port sites were infiltrated with Marcaine and closed in a subcuticular fashion. Sterile dressings were applied. The patient's Abdul catheter was  removed. The patient was extubated in the Operating Room and transported to the Recovery Room in stable condition. All sponge, instrument and needle counts were correct at the end of the case. I was present for the entire procedure.

## 2023-01-05 NOTE — ANESTHESIA POSTPROCEDURE EVALUATION
Anesthesia Post Evaluation    Patient: Duglas Us    Procedure(s) Performed: Procedure(s) (LRB):  APPENDECTOMY, LAPAROSCOPIC (N/A)    Final Anesthesia Type: general      Patient location during evaluation: PACU  Patient participation: Yes- Able to Participate  Level of consciousness: awake and alert and oriented  Post-procedure vital signs: reviewed and stable  Pain management: adequate  Airway patency: patent    PONV status at discharge: No PONV  Anesthetic complications: no      Cardiovascular status: hemodynamically stable  Respiratory status: unassisted and spontaneous ventilation  Hydration status: euvolemic  Follow-up not needed.          Vitals Value Taken Time   /72 01/05/23 0932   Temp 36.7 °C (98 °F) 01/05/23 0845   Pulse 78 01/05/23 0944   Resp 25 01/05/23 0944   SpO2 95 % 01/05/23 0944   Vitals shown include unvalidated device data.      No case tracking events are documented in the log.      Pain/Kyle Score: Pain Rating Prior to Med Admin: 4 (1/5/2023  9:16 AM)  Kyle Score: 10 (1/5/2023  9:00 AM)

## 2023-01-05 NOTE — TRANSFER OF CARE
"Anesthesia Transfer of Care Note    Patient: Duglas Us    Procedure(s) Performed: Procedure(s) (LRB):  APPENDECTOMY, LAPAROSCOPIC (N/A)    Patient location: PACU    Anesthesia Type: general    Transport from OR: Transported from OR on 6-10 L/min O2 by face mask with adequate spontaneous ventilation    Post pain: adequate analgesia    Post assessment: no apparent anesthetic complications    Post vital signs: stable    Level of consciousness: sedated    Nausea/Vomiting: no nausea/vomiting    Complications: none    Transfer of care protocol was followed      Last vitals:   Visit Vitals  BP (!) 159/96 (BP Location: Left arm, Patient Position: Lying)   Pulse 65   Temp 36.5 °C (97.7 °F) (Oral)   Resp 16   Ht 5' 9" (1.753 m)   Wt 81.6 kg (180 lb)   SpO2 100%   BMI 26.58 kg/m²     "

## 2023-01-05 NOTE — ANESTHESIA PREPROCEDURE EVALUATION
01/05/2023  Duglas Us is a 62 y.o., male.  Pre-operative evaluation for Procedure(s) (LRB):  APPENDECTOMY, LAPAROSCOPIC (N/A)    Duglas Us is a 62 y.o. male     Patient Active Problem List   Diagnosis    Acute appendicitis with perforation and localized peritonitis, without abscess or gangrene    Routine health maintenance       Review of patient's allergies indicates:  No Known Allergies    No current facility-administered medications on file prior to encounter.     No current outpatient medications on file prior to encounter.       Past Surgical History:   Procedure Laterality Date    HERNIA REPAIR Right     Inguinal           Pre-op Assessment    I have reviewed the Patient Summary Reports.     I have reviewed the Nursing Notes. I have reviewed the NPO Status.   I have reviewed the Medications.     Review of Systems  Anesthesia Hx:  No problems with previous Anesthesia    Cardiovascular:  Cardiovascular Normal     Pulmonary:  Pulmonary Normal    Renal/:  Renal/ Normal     Hepatic/GI:  Hepatic/GI Normal    Endocrine:  Endocrine Normal        Physical Exam  General: Well nourished    Airway:  Mallampati: II / I  Mouth Opening: Normal  TM Distance: Normal  Tongue: Normal  Neck ROM: Normal ROM    Dental:  Intact        Anesthesia Plan  Type of Anesthesia, risks & benefits discussed:    Anesthesia Type: Gen ETT  Intra-op Monitoring Plan: Standard ASA Monitors  Post Op Pain Control Plan: multimodal analgesia  Induction:  IV  Airway Plan: Direct  Informed Consent: Informed consent signed with the Patient and all parties understand the risks and agree with anesthesia plan.  All questions answered.   ASA Score: 1  Day of Surgery Review of History & Physical: H&P Update referred to the surgeon/provider.    Ready For Surgery From Anesthesia Perspective.     .

## 2023-01-05 NOTE — H&P
[facility]  General Surgery  History & Physical  Date: 01/05/2023  Referring Provider: Sami Sneed    SUBJECTIVE:     Chief complaint:   No chief complaint on file.      History of Present Illness:  Patient is a 62 y.o. male with no significant PMH who presents as a follow up after he was hospitalized for perforated appendicitis in early November. Since discharge, he has been improving. He does have some fatigue when he wakes up in the morning but feels better after he eats. He has minimal abdominal pain. He has completed his antibiotics. He has a history of right inguinal hernia repair. He does not smoke. He does not take any anticoagulants.    He wants to proceed with surgery.     Interval history:  He is doing well. He presents today for surgery. No changes since he was last seen in clinic.    Review of patient's allergies indicates:  No Known Allergies    Current Facility-Administered Medications   Medication Dose Route Frequency Provider Last Rate Last Admin    0.9%  NaCl infusion   Intravenous Continuous Sami Sneed MD        ceFAZolin 2 g in dextrose 5 % in water (D5W) 5 % 50 mL IVPB (MB+)  2 g Intravenous On Call Procedure Sami Sneed MD         Facility-Administered Medications Ordered in Other Encounters   Medication Dose Route Frequency Provider Last Rate Last Admin    acetaminophen 1,000 mg/100 mL (10 mg/mL) injection   Intravenous PRN Dolly Solorzano CRNA   1,000 mg at 01/05/23 0759    ceFAZolin injection   Intravenous PRN Dolly Solorzano CRNA   2 g at 01/05/23 0726    dexAMETHasone injection   Intravenous PRN Dolly Solorzano CRNA   4 mg at 01/05/23 0729    fentaNYL 50 mcg/mL injection   Intravenous PRN Dolly Solorzano CRNA   50 mcg at 01/05/23 0717    isolyte infusion   Intravenous Continuous PRN Dolly Solorzano CRNA   New Bag at 01/05/23 0743    ketamine in 0.9 % sod chloride 50 mg/5 mL (10 mg/mL) injection   Intravenous PRN Dolly BILLY  Rashad, CRNA   25 mg at 01/05/23 0734    LIDOcaine (cardiac) injection   Intravenous PRN Dolly Solorzano, CRNA   60 mg at 01/05/23 0717    midazolam injection   Intravenous PRN Dolly Solorzano, CRNA   2 mg at 01/05/23 0655    ondansetron injection   Intravenous PRN Dolly Solorzano, CRNA   4 mg at 01/05/23 0729    propofol (DIPRIVAN) 10 mg/mL infusion   Intravenous PRN Dolly Solorzano, CRNA   160 mg at 01/05/23 0717    rocuronium injection   Intravenous PRN Dolly Solorzano, CRNA   50 mg at 01/05/23 0717    sodium chloride 0.9% infusion   Intravenous Continuous PRN Dolly Solorzano, CRNA   Stopped at 01/05/23 0743       Past Medical History:   Diagnosis Date    Acute appendicitis with perforation and localized peritonitis, without abscess or gangrene 11/4/2022     Past Surgical History:   Procedure Laterality Date    HERNIA REPAIR Right     Inguinal     Family History   Problem Relation Age of Onset    Stroke Father     Lung cancer Sister     Stroke Paternal Aunt      Social History     Tobacco Use    Smoking status: Never     Passive exposure: Never    Smokeless tobacco: Never   Substance Use Topics    Alcohol use: Not Currently    Drug use: Never        Review of Systems:  Review of Systems   Constitutional:  Positive for fatigue. Negative for activity change, appetite change, fever and unexpected weight change.   HENT:  Negative for congestion, rhinorrhea and trouble swallowing.    Eyes:  Negative for discharge.   Respiratory:  Negative for cough, chest tightness and shortness of breath.    Cardiovascular:  Negative for chest pain and palpitations.   Gastrointestinal:  Negative for anal bleeding, constipation, diarrhea, nausea and vomiting.   Endocrine: Negative for cold intolerance and heat intolerance.   Genitourinary:  Negative for decreased urine volume, difficulty urinating, dysuria and urgency.   Musculoskeletal:  Negative for back pain, joint swelling and neck pain.   Skin:  Negative for color  "change and wound.   Neurological:  Negative for syncope, weakness and light-headedness.   Hematological:  Negative for adenopathy. Does not bruise/bleed easily.     OBJECTIVE:     Vital Signs (Most Recent)  Temp: 97.7 °F (36.5 °C) (01/05/23 0529)  Pulse: 65 (01/05/23 0529)  Resp: 16 (01/05/23 0529)  BP: (!) 159/96 (01/05/23 0529)  SpO2: 100 % (01/05/23 0529)  5' 9" (1.753 m)  81.6 kg (180 lb)     Physical Exam:  Physical Exam  Constitutional:       Appearance: Normal appearance. He is normal weight. He is not ill-appearing or toxic-appearing.   HENT:      Head: Normocephalic and atraumatic.   Eyes:      General: Vision grossly intact.      Extraocular Movements: Extraocular movements intact.   Neck:      Trachea: Trachea and phonation normal.   Cardiovascular:      Rate and Rhythm: Normal rate and regular rhythm.      Heart sounds: Normal heart sounds.   Pulmonary:      Effort: Pulmonary effort is normal.      Breath sounds: Normal breath sounds. No decreased breath sounds.   Chest:      Chest wall: No mass.   Abdominal:      General: Abdomen is flat. Bowel sounds are normal. There is no distension.      Palpations: Abdomen is soft. There is no mass.      Tenderness: There is no abdominal tenderness. There is no guarding.      Hernia: No hernia is present.   Musculoskeletal:         General: No swelling or tenderness. Normal range of motion.      Cervical back: Normal range of motion and neck supple. No muscular tenderness.   Lymphadenopathy:      Cervical: No cervical adenopathy.      Lower Body: No right inguinal adenopathy. No left inguinal adenopathy.   Skin:     General: Skin is warm and dry.      Capillary Refill: Capillary refill takes less than 2 seconds.      Coloration: Skin is not jaundiced.      Findings: No bruising or erythema.   Neurological:      General: No focal deficit present.      Mental Status: He is alert and oriented to person, place, and time.   Psychiatric:         Mood and Affect: Mood " normal.         Behavior: Behavior is cooperative.       Laboratory  CBC: Reviewed  CMP: Reviewed    Diagnostic Results:  CT: Reviewed    Impression:     Acute inflammation within the terminal ileum and cecum with surrounding phlegmon formation, particularly about the cecal base.  This could represent acute appendicitis with reactive inflammatory changes/phlegmon.  Reactive enlarged lymph nodes in the mesentery.  No free air to indicate perforation.  Recommend consultation with surgery.     Asymmetric wall thickening of the gastric body.  Recommend consultation with GI for direct visualization.     Prostatomegaly.  Suggest correlation with PSA.  ASSESSMENT/PLAN:   Duglas Us is a 61 yo M who was admitted with perforated appendicitis, now doing well    PLAN:  -Ok to proceed with surgery. Consent in chart.      Sami Sneed MD  General Surgery and Surgical Critical Care  [facility]

## 2023-01-05 NOTE — ANESTHESIA PROCEDURE NOTES
Intubation    Date/Time: 1/5/2023 7:19 AM  Performed by: Dolly Solorzano CRNA  Authorized by: Audra Delcid MD     Intubation:     Induction:  Intravenous    Intubated:  Postinduction    Mask Ventilation:  Easy mask    Attempts:  1    Attempted By:  CRNA    Method of Intubation:  Video laryngoscopy    Blade:  Morelos 3    Laryngeal View Grade: Grade I - full view of cords      Difficult Airway Encountered?: No      Complications:  None    Airway Device:  Oral endotracheal tube    Airway Device Size:  7.5    Style/Cuff Inflation:  Cuffed (inflated to minimal occlusive pressure)    Tube secured:  22    Secured at:  The lips    Placement Verified By:  Capnometry    Complicating Factors:  None    Findings Post-Intubation:  BS equal bilateral and atraumatic/condition of teeth unchanged

## 2023-01-13 ENCOUNTER — TELEPHONE (OUTPATIENT)
Dept: SURGERY | Facility: CLINIC | Age: 63
End: 2023-01-13

## 2023-01-13 NOTE — TELEPHONE ENCOUNTER
"Spoke with patient regarding request for narcotic pain medication refill. Patient reports intermittent "vibrating pain" around his belly button that started last night. Episodes last approximately 30 minutes. Denies fever,swelling, skin changes or drainage. Patient currently not taking anything for pain. Patient suggested to take Tylenol or Ibuprofen. Instructed to go to ED for any severe pain that is not relieved or fever. Patient verbalized understanding.   "

## 2023-01-18 ENCOUNTER — OFFICE VISIT (OUTPATIENT)
Dept: SURGERY | Facility: CLINIC | Age: 63
End: 2023-01-18
Payer: COMMERCIAL

## 2023-01-18 VITALS
BODY MASS INDEX: 27.33 KG/M2 | WEIGHT: 184.5 LBS | DIASTOLIC BLOOD PRESSURE: 87 MMHG | HEART RATE: 65 BPM | SYSTOLIC BLOOD PRESSURE: 159 MMHG | HEIGHT: 69 IN | OXYGEN SATURATION: 100 %

## 2023-01-18 DIAGNOSIS — Z90.49 S/P LAPAROSCOPIC APPENDECTOMY: Primary | ICD-10-CM

## 2023-01-18 LAB
FINAL PATHOLOGIC DIAGNOSIS: NORMAL
Lab: NORMAL

## 2023-01-18 PROCEDURE — 99024 POSTOP FOLLOW-UP VISIT: CPT | Mod: S$GLB,,, | Performed by: STUDENT IN AN ORGANIZED HEALTH CARE EDUCATION/TRAINING PROGRAM

## 2023-01-18 PROCEDURE — 99999 PR PBB SHADOW E&M-EST. PATIENT-LVL III: ICD-10-PCS | Mod: PBBFAC,,, | Performed by: STUDENT IN AN ORGANIZED HEALTH CARE EDUCATION/TRAINING PROGRAM

## 2023-01-18 PROCEDURE — 99024 PR POST-OP FOLLOW-UP VISIT: ICD-10-PCS | Mod: S$GLB,,, | Performed by: STUDENT IN AN ORGANIZED HEALTH CARE EDUCATION/TRAINING PROGRAM

## 2023-01-18 PROCEDURE — 99999 PR PBB SHADOW E&M-EST. PATIENT-LVL III: CPT | Mod: PBBFAC,,, | Performed by: STUDENT IN AN ORGANIZED HEALTH CARE EDUCATION/TRAINING PROGRAM

## 2023-01-18 NOTE — PROGRESS NOTES
"[facility]  General Surgery  Post-Operative Note    Subjective:       Duglas Us is a 62 year-old man who presents to the clinic 2 weeks following interval laparoscopic appendectomy for perforated appendicitis. He is eating a regular diet without difficulty. Bowel movements are  normal .  The patient is not having any pain. He is mostly back to normal activities. He had some post op pain but he was not taking any OTC medicines. This has since resolved after he took NSAIDs and acetaminophen. He has no complaints today.     Objective:      BP (!) 159/87 (BP Location: Left arm, Patient Position: Sitting)   Pulse 65   Ht 5' 9" (1.753 m)   Wt 83.7 kg (184 lb 8.4 oz)   SpO2 100%   BMI 27.25 kg/m²     General:  alert, appears stated age, and cooperative   Abdomen: soft, bowel sounds active, non-tender   Incision:   healing well, no drainage, no erythema, no hernia, no seroma, no swelling, no dehiscence, incision well approximated      Pathology:  RELIAPATH DIAGNOSIS:   APPENDIX, LAPAROSCOPIC APPENDECTOMY:   - Benign appendix with serosal adhesions.   - No evidence of acute appendicitis.     Assessment:     Duglas Us is a 62 yr old man doing well s/p interval lap appendectomy for perforated appendicitis    Plan:   1. Continue any current medications.  2. Wound care discussed.  3. Return to full duty in 4 weeks.  4. Follow up as needed.      Sami Sneed MD  General Surgery and Surgical Critical Care  [facility]  "

## 2023-01-31 ENCOUNTER — TELEPHONE (OUTPATIENT)
Dept: SURGERY | Facility: CLINIC | Age: 63
End: 2023-01-31

## 2023-02-20 PROBLEM — Z00.00 ROUTINE HEALTH MAINTENANCE: Status: RESOLVED | Noted: 2022-11-21 | Resolved: 2023-02-20

## 2023-07-13 ENCOUNTER — PATIENT OUTREACH (OUTPATIENT)
Dept: ADMINISTRATIVE | Facility: HOSPITAL | Age: 63
End: 2023-07-13
Payer: COMMERCIAL

## 2024-07-31 ENCOUNTER — TELEPHONE (OUTPATIENT)
Dept: FAMILY MEDICINE | Facility: CLINIC | Age: 64
End: 2024-07-31
Payer: COMMERCIAL

## 2024-07-31 NOTE — TELEPHONE ENCOUNTER
----- Message from Eric Garcia sent at 7/31/2024  3:03 PM CDT -----  Regarding: New Patient  Name of Who is Calling:  Patient          What is the request in detail:  Please contact patient he would like to establish care with Dr. Shahid            Can the clinic reply by [facility]: No            What Number to Call Back if not in [facility]:[phone]

## 2024-08-01 ENCOUNTER — HOSPITAL ENCOUNTER (EMERGENCY)
Facility: HOSPITAL | Age: 64
Discharge: HOME | End: 2024-08-01
Attending: STUDENT IN AN ORGANIZED HEALTH CARE EDUCATION/TRAINING PROGRAM
Payer: COMMERCIAL

## 2024-08-01 VITALS
SYSTOLIC BLOOD PRESSURE: 155 MMHG | BODY MASS INDEX: 27.33 KG/M2 | WEIGHT: 184.5 LBS | HEART RATE: 61 BPM | OXYGEN SATURATION: 100 % | HEIGHT: 69 IN | DIASTOLIC BLOOD PRESSURE: 90 MMHG | RESPIRATION RATE: 18 BRPM | TEMPERATURE: 98 F

## 2024-08-01 DIAGNOSIS — H53.9 VISION DISTURBANCE: Primary | ICD-10-CM

## 2024-08-01 PROCEDURE — 99282 EMERGENCY DEPT VISIT SF MDM: CPT

## 2024-08-01 PROCEDURE — 25000003 PHARM REV CODE 250: Performed by: STUDENT IN AN ORGANIZED HEALTH CARE EDUCATION/TRAINING PROGRAM

## 2024-08-01 RX ORDER — PROPARACAINE HYDROCHLORIDE 5 MG/ML
2 SOLUTION/ DROPS OPHTHALMIC
Status: DISCONTINUED | OUTPATIENT
Start: 2024-08-01 | End: 2024-08-01 | Stop reason: HOSPADM

## 2024-08-01 NOTE — DISCHARGE INSTRUCTIONS
If unable to establish an ophthalmology appointment within the next few days, you can also call the phone number below for an urgent appointment on the [facility]:   [phone]

## 2024-08-01 NOTE — ED TRIAGE NOTES
"Duglas Us, a 63 y.o. male presents to the ED w/ complaint of "streaking lights" and "brown spots" in his left peripheral vision for 1 week. Denies N/V/D. No chest pain, SOB, headaches, or dizziness. Denies any prior vision problems. Pt states he may have hit his eye on the wall two weeks ago but he is not sure. AAOx4, GCS 15.     Triage note:  Chief Complaint   Patient presents with    Eye Problem     Pt having "flashing light" in his left peripheral vision x 1 week.       Review of patient's allergies indicates:  No Known Allergies  Past Medical History:   Diagnosis Date    Acute appendicitis with perforation and localized peritonitis, without abscess or gangrene 11/4/2022       "

## 2024-08-01 NOTE — ED PROVIDER NOTES
"Encounter Date: 8/1/2024       History     Chief Complaint   Patient presents with    Eye Problem     Pt having "flashing light" in his left peripheral vision x 1 week.       63 y.o. male with no significant PMHx presents for vision disturbance. Patient reports approximately 1 week of occasional seeing spots in his vision of his left eye.  The spots tend to be localized to the left side of his vision.  He denies any eye pain, loss of vision, double vision, headache, weakness, numbness.    The history is provided by the patient and medical records.     Review of patient's allergies indicates:  No Known Allergies  Past Medical History:   Diagnosis Date    Acute appendicitis with perforation and localized peritonitis, without abscess or gangrene 11/4/2022     Past Surgical History:   Procedure Laterality Date    HERNIA REPAIR Right     Inguinal    LAPAROSCOPIC APPENDECTOMY N/A 1/5/2023    Procedure: APPENDECTOMY, LAPAROSCOPIC;  Surgeon: Sami Sneed MD;  Location: [facility] OR [address] [place] [address];  Service: General;  Laterality: N/A;     Family History   Problem Relation Name Age of Onset    Stroke Father      Lung cancer Sister      Stroke Paternal Aunt       Social History     Tobacco Use    Smoking status: Never     Passive exposure: Never    Smokeless tobacco: Never   Substance Use Topics    Alcohol use: Not Currently    Drug use: Never     Review of Systems   Reason unable to perform ROS: See HPI for relevant ROS.       Physical Exam     Initial Vitals   BP Pulse Resp Temp SpO2   08/01/24 0051 08/01/24 0051 08/01/24 0051 08/01/24 0051 08/01/24 0053   (!) 144/88 63 16 98.1 °F (36.7 °C) 100 %      MAP       --                Physical Exam    Nursing note and vitals reviewed.  Constitutional:   Alert, speaking full sentences, no acute distress   Eyes: Conjunctivae are normal. Pupils are equal, round, and reactive to light. No scleral icterus.   Left eye:   Normal extraocular movements, no corneal changes, IOP 12  Right eye: " Normal extraocular movements, no corneal changes, IOP 12  EOM normal without pain  No periorbital erythema or swelling   Musculoskeletal:         General: No tenderness or edema.     Neurological: He is alert.   Skin: Skin is warm and dry.         ED Course   Procedures  Labs Reviewed - No data to display       Imaging Results    None          Medications - No data to display  Medical Decision Making  63 y.o. male with no significant PMHx presents for vision disturbance  Differentials include cataracts, hypertensive retinopathy, retinal detachment, less likely glaucoma or amaurosis fugax  Patient with spotting peripheral vision in the L eye, intermittent for the past 1-2 weeks. No loss of vision. No pain. No symptoms at time of exam, visual fields intact, visual acuity mildly diminished bilaterally, patient does not have his corrective glasses with him  Exam with normal IOP bilaterally, no corneal changes, no conjunctival injection, has cataracts bilaterally. Slit lamp exam unremarkable,  no corneal fluorescein uptake  Will DC with close ophthalmology f/u, return precautions                                      Clinical Impression:  Final diagnoses:  [H53.9] Vision disturbance (Primary)          ED Disposition Condition    Discharge Stable          ED Prescriptions    None       Follow-up Information       Follow up With Specialties Details Why Contact Info Additional Information    [facility] - [address] [place] [address] Ophthalmology Schedule an appointment as soon as possible for a visit   [address]  [facility] [id number] [phone] Please arrive on the 10th floor for check-in.    [facility] - Emergency Dept Emergency Medicine  As needed, loss of vision, severe headache, severe eye pain, or for any other concerning symptoms [address]  [facility] [id number] [phone]              Anthony Marcus MD  08/02/24 1426

## 2025-02-24 ENCOUNTER — PATIENT OUTREACH (OUTPATIENT)
Dept: ADMINISTRATIVE | Facility: HOSPITAL | Age: 65
End: 2025-02-24
Payer: COMMERCIAL

## (undated) DEVICE — TRAY MINOR GEN SURG OMC

## (undated) DEVICE — TROCAR ENDOPATH XCEL 5MM 7.5CM

## (undated) DEVICE — TROCAR KII BLLN 12MM 10CM

## (undated) DEVICE — BAG TISS RETRV MONARCH 10MM

## (undated) DEVICE — STAPLER INT LINEAR ARTC 3.5-45

## (undated) DEVICE — KIT ANTIFOG W/SPONG & FLUID

## (undated) DEVICE — DRAPE CORETEMP FLD WRM 56X62IN

## (undated) DEVICE — ELECTRODE REM PLYHSV RETURN 9

## (undated) DEVICE — TOWEL OR DISP STRL BLUE 4/PK

## (undated) DEVICE — NDL HYPO REG 25G X 1 1/2

## (undated) DEVICE — DRAPE STERI INSTRUMENT 1018

## (undated) DEVICE — SUT MCRYL PLUS 4-0 PS2 27IN

## (undated) DEVICE — SOL NS 1000CC

## (undated) DEVICE — BLADE SURG CARBON STEEL SZ11

## (undated) DEVICE — CART STAPLE FLEX ETX 3.5MM BLU

## (undated) DEVICE — DRAPE ABDOMINAL TIBURON 14X11

## (undated) DEVICE — TUBING HF INSUFFLATION W/ FLTR

## (undated) DEVICE — SUT 0 VICRYL / UR6 (J603)

## (undated) DEVICE — CART STAPLE RELD 45MM WHT

## (undated) DEVICE — APPLIER CLIP ENDO LIGAMAX 5MM

## (undated) DEVICE — TRAY CATH FOL SIL URIMTR 16FR